# Patient Record
Sex: FEMALE | Race: WHITE | NOT HISPANIC OR LATINO | Employment: OTHER | URBAN - METROPOLITAN AREA
[De-identification: names, ages, dates, MRNs, and addresses within clinical notes are randomized per-mention and may not be internally consistent; named-entity substitution may affect disease eponyms.]

---

## 2023-05-02 DIAGNOSIS — I10 HYPERTENSION, ISOLATED SYSTOLIC: ICD-10-CM

## 2023-05-02 DIAGNOSIS — R35.1 NOCTURIA: ICD-10-CM

## 2023-05-02 DIAGNOSIS — E55.9 VITAMIN D DEFICIENCY: ICD-10-CM

## 2023-05-02 DIAGNOSIS — E78.89 ELEVATED HDL: ICD-10-CM

## 2023-05-02 PROBLEM — F44.9: Status: ACTIVE | Noted: 2023-05-02

## 2023-05-02 PROBLEM — R35.0 FREQUENT URINATION: Status: RESOLVED | Noted: 2023-05-02 | Resolved: 2023-05-02

## 2023-05-02 PROBLEM — S99.929A FOOT INJURY: Status: RESOLVED | Noted: 2023-05-02 | Resolved: 2023-05-02

## 2023-05-02 PROBLEM — N81.10 FEMALE BLADDER PROLAPSE: Status: ACTIVE | Noted: 2023-05-02

## 2023-05-02 PROBLEM — N60.19 FIBROCYSTIC BREAST DISEASE (FCBD): Status: ACTIVE | Noted: 2023-05-02

## 2023-05-02 PROBLEM — R45.1 RESTLESSNESS: Status: ACTIVE | Noted: 2023-05-02

## 2023-05-02 PROBLEM — K21.9 GASTROESOPHAGEAL REFLUX DISEASE: Status: ACTIVE | Noted: 2023-05-02

## 2023-05-02 PROBLEM — N81.4 UTERINE PROLAPSE: Status: RESOLVED | Noted: 2023-05-02 | Resolved: 2023-05-02

## 2023-05-02 PROBLEM — M54.2 CERVICALGIA: Status: ACTIVE | Noted: 2023-05-02

## 2023-05-02 PROBLEM — M17.11 OSTEOARTHRITIS OF RIGHT KNEE: Status: RESOLVED | Noted: 2023-05-02 | Resolved: 2023-05-02

## 2023-05-02 PROBLEM — M79.7 FIBROMYALGIA: Status: ACTIVE | Noted: 2023-05-02

## 2023-05-02 PROBLEM — F43.23 ADJUSTMENT DISORDER WITH MIXED ANXIETY AND DEPRESSED MOOD: Status: ACTIVE | Noted: 2023-05-02

## 2023-05-02 PROBLEM — M81.0 OSTEOPOROSIS: Status: ACTIVE | Noted: 2023-05-02

## 2023-05-02 PROBLEM — F41.9 ANXIETY DISORDER: Status: ACTIVE | Noted: 2023-05-02

## 2023-05-02 PROBLEM — N39.0 UTI (URINARY TRACT INFECTION): Status: RESOLVED | Noted: 2023-05-02 | Resolved: 2023-05-02

## 2023-05-02 PROBLEM — R29.898 WEAKNESS OF LEFT LOWER EXTREMITY: Status: RESOLVED | Noted: 2023-05-02 | Resolved: 2023-05-02

## 2023-05-02 PROBLEM — R26.2 DIFFICULTY WALKING: Status: RESOLVED | Noted: 2023-05-02 | Resolved: 2023-05-02

## 2023-05-02 PROBLEM — R29.898 WEAKNESS OF BOTH HIPS: Status: RESOLVED | Noted: 2023-05-02 | Resolved: 2023-05-02

## 2023-05-02 PROBLEM — M25.561 RIGHT KNEE PAIN: Status: RESOLVED | Noted: 2023-05-02 | Resolved: 2023-05-02

## 2023-05-02 PROBLEM — M17.11 ARTHRITIS OF RIGHT KNEE: Status: RESOLVED | Noted: 2023-05-02 | Resolved: 2023-05-02

## 2023-05-02 PROBLEM — M17.0 OSTEOARTHRITIS OF KNEES, BILATERAL: Status: ACTIVE | Noted: 2023-05-02

## 2023-05-02 PROBLEM — N32.81 OVERACTIVE BLADDER: Status: ACTIVE | Noted: 2023-05-02

## 2023-05-24 ENCOUNTER — LAB (OUTPATIENT)
Dept: LAB | Facility: LAB | Age: 83
End: 2023-05-24
Payer: MEDICARE

## 2023-05-24 DIAGNOSIS — I10 HYPERTENSION, ISOLATED SYSTOLIC: ICD-10-CM

## 2023-05-24 DIAGNOSIS — E78.89 ELEVATED HDL: ICD-10-CM

## 2023-05-24 DIAGNOSIS — R35.1 NOCTURIA: ICD-10-CM

## 2023-05-24 DIAGNOSIS — E55.9 VITAMIN D DEFICIENCY: ICD-10-CM

## 2023-05-24 LAB
ALANINE AMINOTRANSFERASE (SGPT) (U/L) IN SER/PLAS: 17 U/L (ref 7–45)
ALBUMIN (G/DL) IN SER/PLAS: 4 G/DL (ref 3.4–5)
ANION GAP IN SER/PLAS: 13 MMOL/L (ref 10–20)
APPEARANCE, URINE: ABNORMAL
ASPARTATE AMINOTRANSFERASE (SGOT) (U/L) IN SER/PLAS: 20 U/L (ref 9–39)
BACTERIA, URINE: ABNORMAL /HPF
BASOPHILS (10*3/UL) IN BLOOD BY AUTOMATED COUNT: 0.05 X10E9/L (ref 0–0.1)
BASOPHILS/100 LEUKOCYTES IN BLOOD BY AUTOMATED COUNT: 0.8 % (ref 0–2)
BILIRUBIN, URINE: NEGATIVE
BLOOD, URINE: NEGATIVE
CALCIDIOL (25 OH VITAMIN D3) (NG/ML) IN SER/PLAS: 56 NG/ML
CALCIUM (MG/DL) IN SER/PLAS: 8.6 MG/DL (ref 8.6–10.3)
CARBON DIOXIDE, TOTAL (MMOL/L) IN SER/PLAS: 26 MMOL/L (ref 21–32)
CHLORIDE (MMOL/L) IN SER/PLAS: 105 MMOL/L (ref 98–107)
CHOLESTEROL (MG/DL) IN SER/PLAS: 203 MG/DL (ref 0–199)
CHOLESTEROL IN HDL (MG/DL) IN SER/PLAS: 56.3 MG/DL
CHOLESTEROL/HDL RATIO: 3.6
COLOR, URINE: YELLOW
CREATININE (MG/DL) IN SER/PLAS: 0.56 MG/DL (ref 0.5–1.05)
EOSINOPHILS (10*3/UL) IN BLOOD BY AUTOMATED COUNT: 0.18 X10E9/L (ref 0–0.4)
EOSINOPHILS/100 LEUKOCYTES IN BLOOD BY AUTOMATED COUNT: 2.8 % (ref 0–6)
ERYTHROCYTE DISTRIBUTION WIDTH (RATIO) BY AUTOMATED COUNT: 13.2 % (ref 11.5–14.5)
ERYTHROCYTE MEAN CORPUSCULAR HEMOGLOBIN CONCENTRATION (G/DL) BY AUTOMATED: 30.5 G/DL (ref 32–36)
ERYTHROCYTE MEAN CORPUSCULAR VOLUME (FL) BY AUTOMATED COUNT: 102 FL (ref 80–100)
ERYTHROCYTES (10*6/UL) IN BLOOD BY AUTOMATED COUNT: 4.08 X10E12/L (ref 4–5.2)
GFR FEMALE: 90 ML/MIN/1.73M2
GLUCOSE (MG/DL) IN SER/PLAS: 84 MG/DL (ref 74–99)
GLUCOSE, URINE: NEGATIVE MG/DL
HEMATOCRIT (%) IN BLOOD BY AUTOMATED COUNT: 41.6 % (ref 36–46)
HEMOGLOBIN (G/DL) IN BLOOD: 12.7 G/DL (ref 12–16)
IMMATURE GRANULOCYTES/100 LEUKOCYTES IN BLOOD BY AUTOMATED COUNT: 0.2 % (ref 0–0.9)
KETONES, URINE: NEGATIVE MG/DL
LDL: 131 MG/DL (ref 0–99)
LEUKOCYTE ESTERASE, URINE: ABNORMAL
LEUKOCYTES (10*3/UL) IN BLOOD BY AUTOMATED COUNT: 6.4 X10E9/L (ref 4.4–11.3)
LYMPHOCYTES (10*3/UL) IN BLOOD BY AUTOMATED COUNT: 2.04 X10E9/L (ref 0.8–3)
LYMPHOCYTES/100 LEUKOCYTES IN BLOOD BY AUTOMATED COUNT: 31.9 % (ref 13–44)
MONOCYTES (10*3/UL) IN BLOOD BY AUTOMATED COUNT: 0.68 X10E9/L (ref 0.05–0.8)
MONOCYTES/100 LEUKOCYTES IN BLOOD BY AUTOMATED COUNT: 10.6 % (ref 2–10)
MUCUS, URINE: ABNORMAL /LPF
NEUTROPHILS (10*3/UL) IN BLOOD BY AUTOMATED COUNT: 3.44 X10E9/L (ref 1.6–5.5)
NEUTROPHILS/100 LEUKOCYTES IN BLOOD BY AUTOMATED COUNT: 53.7 % (ref 40–80)
NITRITE, URINE: NEGATIVE
PH, URINE: 7 (ref 5–8)
PHOSPHATE (MG/DL) IN SER/PLAS: 3.8 MG/DL (ref 2.5–4.9)
PLATELETS (10*3/UL) IN BLOOD AUTOMATED COUNT: 332 X10E9/L (ref 150–450)
POTASSIUM (MMOL/L) IN SER/PLAS: 3.9 MMOL/L (ref 3.5–5.3)
PROTEIN, URINE: NEGATIVE MG/DL
RBC, URINE: 1 /HPF (ref 0–5)
SODIUM (MMOL/L) IN SER/PLAS: 140 MMOL/L (ref 136–145)
SPECIFIC GRAVITY, URINE: 1.01 (ref 1–1.03)
SQUAMOUS EPITHELIAL CELLS, URINE: 17 /HPF
THYROTROPIN (MIU/L) IN SER/PLAS BY DETECTION LIMIT <= 0.05 MIU/L: 1.76 MIU/L (ref 0.44–3.98)
TRANSITIONAL EPITHELIAL CELLS, URINE: <1 /HPF
TRIGLYCERIDE (MG/DL) IN SER/PLAS: 78 MG/DL (ref 0–149)
UREA NITROGEN (MG/DL) IN SER/PLAS: 19 MG/DL (ref 6–23)
UROBILINOGEN, URINE: <2 MG/DL (ref 0–1.9)
VLDL: 16 MG/DL (ref 0–40)
WBC, URINE: 6 /HPF (ref 0–5)

## 2023-05-24 PROCEDURE — 84450 TRANSFERASE (AST) (SGOT): CPT

## 2023-05-24 PROCEDURE — 80069 RENAL FUNCTION PANEL: CPT

## 2023-05-24 PROCEDURE — 84443 ASSAY THYROID STIM HORMONE: CPT

## 2023-05-24 PROCEDURE — 82306 VITAMIN D 25 HYDROXY: CPT

## 2023-05-24 PROCEDURE — 85025 COMPLETE CBC W/AUTO DIFF WBC: CPT

## 2023-05-24 PROCEDURE — 81001 URINALYSIS AUTO W/SCOPE: CPT

## 2023-05-24 PROCEDURE — 84460 ALANINE AMINO (ALT) (SGPT): CPT

## 2023-05-24 PROCEDURE — 36415 COLL VENOUS BLD VENIPUNCTURE: CPT

## 2023-05-24 PROCEDURE — 80061 LIPID PANEL: CPT

## 2023-05-25 PROBLEM — M17.11 PRIMARY OSTEOARTHRITIS OF RIGHT KNEE: Status: RESOLVED | Noted: 2017-12-11 | Resolved: 2023-05-25

## 2023-05-25 PROBLEM — M25.561 CHRONIC PAIN OF RIGHT KNEE: Status: ACTIVE | Noted: 2017-12-11

## 2023-05-25 PROBLEM — G47.00 INSOMNIA: Status: ACTIVE | Noted: 2023-05-25

## 2023-05-25 PROBLEM — G89.29 CHRONIC PAIN OF RIGHT KNEE: Status: ACTIVE | Noted: 2017-12-11

## 2023-05-25 PROBLEM — I10 ESSENTIAL HYPERTENSION: Status: RESOLVED | Noted: 2017-11-07 | Resolved: 2023-05-25

## 2023-05-25 RX ORDER — FLUTICASONE PROPIONATE 50 MCG
1 SPRAY, SUSPENSION (ML) NASAL
COMMUNITY
Start: 2014-11-26

## 2023-05-25 RX ORDER — DIAZEPAM 5 MG/1
5 TABLET ORAL NIGHTLY PRN
COMMUNITY
Start: 2017-11-07 | End: 2024-02-16 | Stop reason: ALTCHOICE

## 2023-05-25 RX ORDER — CYCLOBENZAPRINE HCL 5 MG
1 TABLET ORAL 3 TIMES DAILY PRN
COMMUNITY
Start: 2022-02-16 | End: 2023-05-30 | Stop reason: ALTCHOICE

## 2023-05-25 RX ORDER — TROSPIUM CHLORIDE 20 MG/1
20 TABLET, FILM COATED ORAL 2 TIMES DAILY
COMMUNITY
Start: 2022-08-15 | End: 2023-05-30 | Stop reason: ALTCHOICE

## 2023-05-25 RX ORDER — CHOLECALCIFEROL (VITAMIN D3) 25 MCG
25 TABLET ORAL EVERY 24 HOURS
COMMUNITY
Start: 2015-10-23

## 2023-05-25 RX ORDER — METHYLPREDNISOLONE 4 MG
500 TABLET, DOSE PACK ORAL 3 TIMES DAILY
COMMUNITY
Start: 2017-05-25 | End: 2024-02-16 | Stop reason: ALTCHOICE

## 2023-05-25 RX ORDER — HYDROCHLOROTHIAZIDE 25 MG/1
25 TABLET ORAL
COMMUNITY
Start: 2018-07-06 | End: 2023-05-30 | Stop reason: ALTCHOICE

## 2023-05-25 RX ORDER — DOCUSATE SODIUM 100 MG/1
100 CAPSULE, LIQUID FILLED ORAL 2 TIMES DAILY
COMMUNITY
Start: 2022-09-26

## 2023-05-25 RX ORDER — CELECOXIB 100 MG/1
100 CAPSULE ORAL 2 TIMES DAILY
COMMUNITY
Start: 2022-09-26 | End: 2023-05-30 | Stop reason: ALTCHOICE

## 2023-05-25 RX ORDER — TRAMADOL HYDROCHLORIDE 50 MG/1
50 TABLET ORAL EVERY 6 HOURS PRN
COMMUNITY
Start: 2014-03-22 | End: 2024-02-16 | Stop reason: ALTCHOICE

## 2023-05-25 RX ORDER — AMLODIPINE BESYLATE 5 MG/1
5 TABLET ORAL DAILY
COMMUNITY
End: 2023-10-11 | Stop reason: SDUPTHER

## 2023-05-25 RX ORDER — DENOSUMAB 60 MG/ML
60 INJECTION SUBCUTANEOUS
COMMUNITY
Start: 2021-08-04 | End: 2023-07-12 | Stop reason: SDUPTHER

## 2023-05-25 RX ORDER — ASPIRIN 81 MG/1
81 TABLET ORAL DAILY
COMMUNITY
Start: 2018-11-07 | End: 2023-05-30 | Stop reason: ALTCHOICE

## 2023-05-25 RX ORDER — CHLORHEXIDINE GLUCONATE ORAL RINSE 1.2 MG/ML
15 SOLUTION DENTAL AS NEEDED
COMMUNITY
Start: 2022-09-08 | End: 2023-05-30 | Stop reason: ALTCHOICE

## 2023-05-25 RX ORDER — METOPROLOL SUCCINATE 50 MG/1
50 TABLET, EXTENDED RELEASE ORAL DAILY
COMMUNITY
End: 2023-06-08 | Stop reason: SDUPTHER

## 2023-05-30 ENCOUNTER — OFFICE VISIT (OUTPATIENT)
Dept: PRIMARY CARE | Facility: CLINIC | Age: 83
End: 2023-05-30
Payer: MEDICARE

## 2023-05-30 VITALS
RESPIRATION RATE: 16 BRPM | SYSTOLIC BLOOD PRESSURE: 140 MMHG | DIASTOLIC BLOOD PRESSURE: 76 MMHG | HEIGHT: 67 IN | HEART RATE: 72 BPM | WEIGHT: 171 LBS | BODY MASS INDEX: 26.84 KG/M2

## 2023-05-30 DIAGNOSIS — N81.10 FEMALE BLADDER PROLAPSE: ICD-10-CM

## 2023-05-30 DIAGNOSIS — F51.01 PRIMARY INSOMNIA: ICD-10-CM

## 2023-05-30 DIAGNOSIS — Z51.81 THERAPEUTIC DRUG MONITORING: ICD-10-CM

## 2023-05-30 DIAGNOSIS — M81.0 AGE-RELATED OSTEOPOROSIS WITHOUT CURRENT PATHOLOGICAL FRACTURE: ICD-10-CM

## 2023-05-30 DIAGNOSIS — N60.19 FIBROCYSTIC BREAST DISEASE (FCBD), UNSPECIFIED LATERALITY: ICD-10-CM

## 2023-05-30 DIAGNOSIS — F41.9 ANXIETY: ICD-10-CM

## 2023-05-30 DIAGNOSIS — F43.23 ADJUSTMENT DISORDER WITH MIXED ANXIETY AND DEPRESSED MOOD: ICD-10-CM

## 2023-05-30 DIAGNOSIS — Z12.31 ENCOUNTER FOR SCREENING MAMMOGRAM FOR MALIGNANT NEOPLASM OF BREAST: ICD-10-CM

## 2023-05-30 DIAGNOSIS — E78.89 ELEVATED HDL: ICD-10-CM

## 2023-05-30 DIAGNOSIS — M17.0 PRIMARY OSTEOARTHRITIS OF BOTH KNEES: Primary | ICD-10-CM

## 2023-05-30 DIAGNOSIS — I10 HYPERTENSION, ISOLATED SYSTOLIC: ICD-10-CM

## 2023-05-30 DIAGNOSIS — Z00.00 ROUTINE GENERAL MEDICAL EXAMINATION AT HEALTH CARE FACILITY: ICD-10-CM

## 2023-05-30 DIAGNOSIS — Z00.00 ROUTINE GENERAL MEDICAL EXAMINATION AT A HEALTH CARE FACILITY: Primary | ICD-10-CM

## 2023-05-30 DIAGNOSIS — F41.9 ANXIETY DISORDER, UNSPECIFIED TYPE: ICD-10-CM

## 2023-05-30 DIAGNOSIS — R60.0 LOCALIZED EDEMA: ICD-10-CM

## 2023-05-30 PROCEDURE — G0439 PPPS, SUBSEQ VISIT: HCPCS | Performed by: INTERNAL MEDICINE

## 2023-05-30 PROCEDURE — 93000 ELECTROCARDIOGRAM COMPLETE: CPT | Performed by: INTERNAL MEDICINE

## 2023-05-30 PROCEDURE — 99214 OFFICE O/P EST MOD 30 MIN: CPT | Performed by: INTERNAL MEDICINE

## 2023-05-30 PROCEDURE — 3078F DIAST BP <80 MM HG: CPT | Performed by: INTERNAL MEDICINE

## 2023-05-30 PROCEDURE — G0009 ADMIN PNEUMOCOCCAL VACCINE: HCPCS | Performed by: INTERNAL MEDICINE

## 2023-05-30 PROCEDURE — 1036F TOBACCO NON-USER: CPT | Performed by: INTERNAL MEDICINE

## 2023-05-30 PROCEDURE — 3077F SYST BP >= 140 MM HG: CPT | Performed by: INTERNAL MEDICINE

## 2023-05-30 PROCEDURE — 1159F MED LIST DOCD IN RCRD: CPT | Performed by: INTERNAL MEDICINE

## 2023-05-30 PROCEDURE — 1126F AMNT PAIN NOTED NONE PRSNT: CPT | Performed by: INTERNAL MEDICINE

## 2023-05-30 PROCEDURE — 90677 PCV20 VACCINE IM: CPT | Performed by: INTERNAL MEDICINE

## 2023-05-30 PROCEDURE — 1160F RVW MEDS BY RX/DR IN RCRD: CPT | Performed by: INTERNAL MEDICINE

## 2023-05-30 ASSESSMENT — ENCOUNTER SYMPTOMS: OCCASIONAL FEELINGS OF UNSTEADINESS: 0

## 2023-05-30 NOTE — PROGRESS NOTES
Edema left greater than right  biLateral knee scars  Subjective   Reason for Visit: Alecia Baptiste is an 82 y.o. female here for a Medicare Wellness visit.     Past Medical, Surgical, and Family History reviewed and updated in chart.       Overall doing well status post right TKA  Improve mobility, less pain, rare use of tramadol, using cane out of home, usual analgesic Motrin 1-2 tabs  Physical therapy requested for core strengthening and hips    Stress  decreased mobility and back pain  [place] helpful  For cataract  Skin check in [place]  Weight, leg swelling, question diuretic  Nocturia x 2 questional bladder lift      Patient Care Team:  Kye De La O MD as PCP - General  Kye De La O MD as PCP - [facility]     Review of Systems   Constitutional:  Negative for chills, fatigue, fever and unexpected weight change.        Weight 170 pounds   HENT:  Positive for hearing loss. Negative for dental problem, sore throat, tinnitus, trouble swallowing and voice change.    Eyes: Negative.  Negative for visual disturbance.        Eye care up-to-date   Respiratory: Negative.  Negative for cough, chest tightness and shortness of breath.    Cardiovascular:  Positive for leg swelling.   Gastrointestinal: Negative.    Endocrine: Negative.  Negative for polydipsia, polyphagia and polyuria.   Genitourinary:  Positive for frequency and urgency.        Nocturia   Musculoskeletal:  Positive for arthralgias. Negative for gait problem.        As noted in HPI   Skin: Negative.         Derm evaluation in [place]   Neurological: Negative.  Negative for tremors, weakness, light-headedness, numbness and headaches.   Psychiatric/Behavioral:  Positive for dysphoric mood. Negative for sleep disturbance. The patient is not nervous/anxious.         Continues counseling concerning son sudden death,  health, 63 years of marriage       Objective   Vitals:  /76 (BP Location: Left arm, Patient Position:  "Sitting)   Pulse 72   Resp 16   Ht 1.702 m (5' 7\")   Wt 77.6 kg (171 lb)   BMI 26.78 kg/m²       Physical Exam  Constitutional:       General: She is not in acute distress.     Appearance: Normal appearance. She is normal weight.   HENT:      Head: Normocephalic.      Right Ear: Tympanic membrane normal.      Left Ear: Tympanic membrane normal.      Ears:      Comments: Hearing intact to speech and finger rub     Nose: Nose normal.      Mouth/Throat:      Mouth: Mucous membranes are moist.      Pharynx: Oropharynx is clear.   Eyes:      General: No scleral icterus.     Extraocular Movements: Extraocular movements intact.      Conjunctiva/sclera: Conjunctivae normal.   Neck:      Vascular: No carotid bruit.      Comments: No JVD or thyromegaly, spine nontender  Cardiovascular:      Rate and Rhythm: Normal rate and regular rhythm.      Pulses: Normal pulses.      Heart sounds: Normal heart sounds. No murmur heard.  Pulmonary:      Effort: Pulmonary effort is normal.      Breath sounds: Normal breath sounds.   Abdominal:      General: Abdomen is flat. Bowel sounds are normal. There is no distension.      Palpations: Abdomen is soft. There is no mass.      Tenderness: There is no abdominal tenderness.      Hernia: No hernia is present.   Musculoskeletal:         General: Normal range of motion.      Cervical back: Normal range of motion and neck supple. No tenderness.      Right lower leg: Edema present.      Left lower leg: Edema present.      Comments: Bilateral knee surgical scars   Lymphadenopathy:      Cervical: No cervical adenopathy.   Skin:     General: Skin is warm.      Findings: No lesion or rash.   Neurological:      General: No focal deficit present.      Mental Status: She is alert and oriented to person, place, and time.      Cranial Nerves: No cranial nerve deficit.      Sensory: No sensory deficit.      Motor: No weakness.      Coordination: Coordination normal.      Gait: Gait normal.      Deep " Tendon Reflexes: Reflexes normal.   Psychiatric:         Mood and Affect: Mood normal.         Behavior: Behavior normal.         Thought Content: Thought content normal.     Data  Electrocardiogram normal sinus rhythm  Lab 5/24/2023 reviewed satisfactory  Bone densitometry 6/16/2021 osteoporosis  Colonoscopy 2006, January 2011, no Cologuard  Mammogram 10/30/2018  CSA 1/13/2021  CT scan head July 2018      Assessment/Plan     Overall you are in good health today age and gender appropriate wellness services reviewed  No active heart disease and risk factors at satisfactory/target levels  Coronary artery calcium CT score in 2011 was low risk  Cardiac risk counseling  Age and gender appropriate cancer screening prevention is up-to-date with a follow-up mammogram  Osteoporosis is present, recheck bone densitometry, reinforced adequate calcium and vitamin D intake, weightbearing exercise as tolerated  Immunizations updated with Prevnar 20  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 good response to bilateral knee replacement  Cervical spondylosis stable to improved  Adjustment disorder with mixed emotional features coping well with current medication and counseling  Controlled substance patient agreement  Cystocele/pelvic relaxation syndrome reviewed  Peripheral edema likely secondary to venous stasis, recommend conservative care  Problem List Items Addressed This Visit       Female bladder prolapse    Osteoporosis    Relevant Orders    XR DEXA bone density    Hypertension, isolated systolic    Relevant Orders    ECG 12 lead (Clinic Performed) (Completed)    BI mammo bilateral screening tomosynthesis    Fibrocystic breast disease (FCBD)    Relevant Orders    BI mammo bilateral screening tomosynthesis    Elevated HDL    Anxiety    Relevant Orders    Drug Screen, Urine With Reflex to Confirmation (Completed)    Adjustment disorder with mixed anxiety and depressed mood    Insomnia    Therapeutic drug monitoring    Relevant Orders     Drug Screen, Urine With Reflex to Confirmation (Completed)    Routine general medical examination at a health care facility - Primary     Other Visit Diagnoses       Encounter for screening mammogram for malignant neoplasm of breast        Relevant Orders    BI mammo bilateral screening tomosynthesis

## 2023-06-07 ENCOUNTER — LAB (OUTPATIENT)
Dept: LAB | Facility: LAB | Age: 83
End: 2023-06-07
Payer: MEDICARE

## 2023-06-07 DIAGNOSIS — Z51.81 THERAPEUTIC DRUG MONITORING: ICD-10-CM

## 2023-06-07 DIAGNOSIS — F41.9 ANXIETY: ICD-10-CM

## 2023-06-07 LAB
AMPHETAMINE (PRESENCE) IN URINE BY SCREEN METHOD: NORMAL
BARBITURATES PRESENCE IN URINE BY SCREEN METHOD: NORMAL
BENZODIAZEPINE (PRESENCE) IN URINE BY SCREEN METHOD: NORMAL
CANNABINOIDS IN URINE BY SCREEN METHOD: NORMAL
COCAINE (PRESENCE) IN URINE BY SCREEN METHOD: NORMAL
DRUG SCREEN COMMENT URINE: NORMAL
FENTANYL URINE: NORMAL
METHADONE (PRESENCE) IN URINE BY SCREEN METHOD: NORMAL
OPIATES (PRESENCE) IN URINE BY SCREEN METHOD: NORMAL
OXYCODONE (PRESENCE) IN URINE BY SCREEN METHOD: NORMAL
PHENCYCLIDINE (PRESENCE) IN URINE BY SCREEN METHOD: NORMAL

## 2023-06-07 PROCEDURE — 80307 DRUG TEST PRSMV CHEM ANLYZR: CPT

## 2023-06-08 DIAGNOSIS — I10 BENIGN ESSENTIAL HTN: ICD-10-CM

## 2023-06-09 RX ORDER — METOPROLOL SUCCINATE 50 MG/1
50 TABLET, EXTENDED RELEASE ORAL DAILY
Qty: 90 TABLET | Refills: 3 | Status: SHIPPED | OUTPATIENT
Start: 2023-06-09

## 2023-07-12 DIAGNOSIS — M81.0 AGE-RELATED OSTEOPOROSIS WITHOUT CURRENT PATHOLOGICAL FRACTURE: Primary | ICD-10-CM

## 2023-07-14 RX ORDER — DENOSUMAB 60 MG/ML
60 INJECTION SUBCUTANEOUS
Qty: 1 ML | Refills: 1 | Status: SHIPPED | OUTPATIENT
Start: 2023-07-14 | End: 2023-08-17 | Stop reason: SDUPTHER

## 2023-08-10 PROBLEM — Z00.00 ROUTINE GENERAL MEDICAL EXAMINATION AT A HEALTH CARE FACILITY: Status: ACTIVE | Noted: 2023-08-10

## 2023-08-10 PROBLEM — Z51.81 THERAPEUTIC DRUG MONITORING: Status: ACTIVE | Noted: 2023-08-10

## 2023-08-10 PROBLEM — R60.0 LOCALIZED EDEMA: Status: ACTIVE | Noted: 2023-08-10

## 2023-08-10 ASSESSMENT — ENCOUNTER SYMPTOMS
WEAKNESS: 0
FEVER: 0
LIGHT-HEADEDNESS: 0
CHEST TIGHTNESS: 0
RESPIRATORY NEGATIVE: 1
TROUBLE SWALLOWING: 0
POLYDIPSIA: 0
DYSPHORIC MOOD: 1
SORE THROAT: 0
NUMBNESS: 0
VOICE CHANGE: 0
FATIGUE: 0
GASTROINTESTINAL NEGATIVE: 1
HEADACHES: 0
NEUROLOGICAL NEGATIVE: 1
ARTHRALGIAS: 1
COUGH: 0
POLYPHAGIA: 0
SLEEP DISTURBANCE: 0
CHILLS: 0
NERVOUS/ANXIOUS: 0
TREMORS: 0
UNEXPECTED WEIGHT CHANGE: 0
FREQUENCY: 1
EYES NEGATIVE: 1
SHORTNESS OF BREATH: 0
ENDOCRINE NEGATIVE: 1

## 2023-08-17 DIAGNOSIS — M81.0 AGE-RELATED OSTEOPOROSIS WITHOUT CURRENT PATHOLOGICAL FRACTURE: ICD-10-CM

## 2023-08-17 RX ORDER — DENOSUMAB 60 MG/ML
60 INJECTION SUBCUTANEOUS
Qty: 1 ML | Refills: 1 | Status: SHIPPED | OUTPATIENT
Start: 2023-08-17 | End: 2024-02-16 | Stop reason: ALTCHOICE

## 2023-10-11 DIAGNOSIS — I10 HYPERTENSION, ISOLATED SYSTOLIC: Primary | ICD-10-CM

## 2023-10-11 RX ORDER — AMLODIPINE BESYLATE 5 MG/1
5 TABLET ORAL DAILY
Qty: 90 TABLET | Refills: 3 | Status: SHIPPED | OUTPATIENT
Start: 2023-10-11

## 2024-02-16 ENCOUNTER — OFFICE VISIT (OUTPATIENT)
Dept: PRIMARY CARE | Facility: CLINIC | Age: 84
End: 2024-02-16
Payer: MEDICARE

## 2024-02-16 VITALS
RESPIRATION RATE: 18 BRPM | BODY MASS INDEX: 27.62 KG/M2 | HEIGHT: 67 IN | OXYGEN SATURATION: 98 % | WEIGHT: 176 LBS | HEART RATE: 76 BPM | DIASTOLIC BLOOD PRESSURE: 83 MMHG | SYSTOLIC BLOOD PRESSURE: 173 MMHG

## 2024-02-16 DIAGNOSIS — Z00.00 HEALTHCARE MAINTENANCE: ICD-10-CM

## 2024-02-16 DIAGNOSIS — M81.0 AGE RELATED OSTEOPOROSIS, UNSPECIFIED PATHOLOGICAL FRACTURE PRESENCE: ICD-10-CM

## 2024-02-16 DIAGNOSIS — F41.9 ANXIETY: ICD-10-CM

## 2024-02-16 DIAGNOSIS — E55.9 VITAMIN D DEFICIENCY: ICD-10-CM

## 2024-02-16 DIAGNOSIS — I10 ESSENTIAL (PRIMARY) HYPERTENSION: ICD-10-CM

## 2024-02-16 PROBLEM — H40.009 GLAUCOMA SUSPECT: Status: ACTIVE | Noted: 2023-05-30

## 2024-02-16 PROBLEM — Z96.1 PSEUDOPHAKIA OF RIGHT EYE: Status: ACTIVE | Noted: 2023-08-08

## 2024-02-16 PROCEDURE — 1123F ACP DISCUSS/DSCN MKR DOCD: CPT | Performed by: INTERNAL MEDICINE

## 2024-02-16 PROCEDURE — 1158F ADVNC CARE PLAN TLK DOCD: CPT | Performed by: INTERNAL MEDICINE

## 2024-02-16 PROCEDURE — 99204 OFFICE O/P NEW MOD 45 MIN: CPT | Performed by: INTERNAL MEDICINE

## 2024-02-16 PROCEDURE — 3077F SYST BP >= 140 MM HG: CPT | Performed by: INTERNAL MEDICINE

## 2024-02-16 PROCEDURE — 3079F DIAST BP 80-89 MM HG: CPT | Performed by: INTERNAL MEDICINE

## 2024-02-16 PROCEDURE — 1159F MED LIST DOCD IN RCRD: CPT | Performed by: INTERNAL MEDICINE

## 2024-02-16 PROCEDURE — 1036F TOBACCO NON-USER: CPT | Performed by: INTERNAL MEDICINE

## 2024-02-16 PROCEDURE — 1160F RVW MEDS BY RX/DR IN RCRD: CPT | Performed by: INTERNAL MEDICINE

## 2024-02-16 PROCEDURE — 1126F AMNT PAIN NOTED NONE PRSNT: CPT | Performed by: INTERNAL MEDICINE

## 2024-02-16 RX ORDER — BUSPIRONE HYDROCHLORIDE 10 MG/1
10 TABLET ORAL 3 TIMES DAILY PRN
Qty: 90 TABLET | Refills: 0 | Status: SHIPPED | OUTPATIENT
Start: 2024-02-16 | End: 2024-03-18 | Stop reason: SDUPTHER

## 2024-02-16 ASSESSMENT — PATIENT HEALTH QUESTIONNAIRE - PHQ9
1. LITTLE INTEREST OR PLEASURE IN DOING THINGS: NOT AT ALL
SUM OF ALL RESPONSES TO PHQ9 QUESTIONS 1 AND 2: 3
5. POOR APPETITE OR OVEREATING: NOT AT ALL
4. FEELING TIRED OR HAVING LITTLE ENERGY: NOT AT ALL
7. TROUBLE CONCENTRATING ON THINGS, SUCH AS READING THE NEWSPAPER OR WATCHING TELEVISION: NOT AT ALL
SUM OF ALL RESPONSES TO PHQ QUESTIONS 1-9: 4
10. IF YOU CHECKED OFF ANY PROBLEMS, HOW DIFFICULT HAVE THESE PROBLEMS MADE IT FOR YOU TO DO YOUR WORK, TAKE CARE OF THINGS AT HOME, OR GET ALONG WITH OTHER PEOPLE: NOT DIFFICULT AT ALL
3. TROUBLE FALLING OR STAYING ASLEEP OR SLEEPING TOO MUCH: SEVERAL DAYS
6. FEELING BAD ABOUT YOURSELF - OR THAT YOU ARE A FAILURE OR HAVE LET YOURSELF OR YOUR FAMILY DOWN: NOT AT ALL
2. FEELING DOWN, DEPRESSED OR HOPELESS: NEARLY EVERY DAY
9. THOUGHTS THAT YOU WOULD BE BETTER OFF DEAD, OR OF HURTING YOURSELF: NOT AT ALL
8. MOVING OR SPEAKING SO SLOWLY THAT OTHER PEOPLE COULD HAVE NOTICED. OR THE OPPOSITE, BEING SO FIGETY OR RESTLESS THAT YOU HAVE BEEN MOVING AROUND A LOT MORE THAN USUAL: NOT AT ALL

## 2024-02-16 ASSESSMENT — PAIN SCALES - GENERAL: PAINLEVEL: 0-NO PAIN

## 2024-02-16 ASSESSMENT — ENCOUNTER SYMPTOMS: FREQUENCY: 1

## 2024-02-16 NOTE — PATIENT INSTRUCTIONS
It was nice to meet you in the office today  As discussed during our visit...    You are due for a DEXA scan (Bone Density screening). Someone will contact you soon to schedule this.  - we will call you with the results and possibly starting on fosamax weekly at home.    STOP Valium  START on Buspar 10mg three times a day as needed for anxiety    Monitor blood pressure three times a week for a few weeks and let us know how it runs.

## 2024-02-16 NOTE — PROGRESS NOTES
Patient ID:   Alecia Baptiste is a 83 y.o. female with PMH remarkable for osteoporosis, HTN, anxiety,  who presents to the office today for New Patient Visit, Anxiety, and Depression ( is in Nursing Home in [place], not handling it well. ).    HEALTH MAINTENANCE: Establish Care  Previous PCP: Dr Kye De La O  Smoking: Never a Smoker  Mammogram (40-75): 2018 -ve -> out of age range  - mother had breast cancer in her 60's  Labs: 5/24/2023  Colonoscopy (45-75): 1/15/2020 cologuard  DEXA (65+, q 2 years):  2021 showing osteoporosis -> DUE  - 1 son passed away unexpectedly in his 40s  - reports that she has a huge desire to live  - has a a lot of grandchildren that she is adores  - sleeping and eating ok  -  is in a SNF currently    Pt saw Dr Haven Mayer MD for OBGYN  - let us know if you would like to see a urologist, GYN for the OAB    Discussed with her about starting on a SSRI OR PRN buspar. She would like to try the PRN buspar and stop the Valium. She does not take the valium often.   - discussed with her about STOPPING Valium PRN  - ok to STOP prolia injection. Will order dexa scan to reassess. I feel she would benefit from taking a weekly fosamax. Pt does not believe that she has been on this in the past.     SOCIAL HISTORY:  Social History     Tobacco Use    Smoking status: Never     Passive exposure: Never    Smokeless tobacco: Never   Vaping Use    Vaping Use: Never used   Substance Use Topics    Alcohol use: Yes     Alcohol/week: 7.0 standard drinks of alcohol     Types: 7 Glasses of wine per week    Drug use: Never     IMMUNIZATIONS:  Immunization History   Administered Date(s) Administered    Flu vaccine, quadrivalent, high-dose, preservative free, age 65y+ (FLUZONE) 12/01/2023    Influenza, High Dose Seasonal, Preservative Free 11/07/2017, 09/19/2020    Influenza, Seasonal, Quadrivalent, Adjuvanted 12/06/2021, 11/14/2022    Influenza, injectable, quadrivalent 09/25/2014, 11/01/2016     Influenza, seasonal, injectable 10/23/2015    Influenza, trivalent, adjuvanted 11/14/2018, 12/07/2019    Moderna SARS-CoV-2 Vaccination 01/19/2021, 02/16/2021, 11/10/2021    Pfizer COVID-19 vaccine, bivalent, age 12 years and older (30 mcg/0.3 mL) 12/04/2022    Pfizer Gray Cap SARS-CoV-2 06/04/2022    Pneumococcal conjugate vaccine, 13-valent (PREVNAR 13) 10/23/2015    Pneumococcal conjugate vaccine, 20-valent (PREVNAR 20) 05/30/2023    Pneumococcal polysaccharide vaccine, 23-valent, age 2 years and older (PNEUMOVAX 23) 11/17/2003    Td vaccine, age 7 years and older (TDVAX) 03/14/1992, 01/10/2000, 10/26/2009    Zoster vaccine, recombinant, adult (SHINGRIX) 09/19/2020, 03/03/2021    Zoster, live 01/09/2009     REVIEW OF SYSTEMS:  Review of Systems   Genitourinary:  Positive for frequency and urgency.   Psychiatric/Behavioral:          Increased stress lately   All other systems reviewed and are negative.    ALLERGIES:  No Known Allergies     VITAL SIGNS:  Vitals:    02/16/24 1355   BP: 173/83   Pulse:    Resp:    SpO2:      Physical Exam  Vitals reviewed.   Constitutional:       General: She is not in acute distress.     Appearance: Normal appearance. She is not ill-appearing.   HENT:      Head: Normocephalic and atraumatic.      Right Ear: Tympanic membrane and external ear normal.      Left Ear: Tympanic membrane and external ear normal.      Nose: Nose normal.      Mouth/Throat:      Mouth: Mucous membranes are moist.      Pharynx: Oropharynx is clear.   Eyes:      Conjunctiva/sclera: Conjunctivae normal.      Pupils: Pupils are equal, round, and reactive to light.   Cardiovascular:      Rate and Rhythm: Normal rate and regular rhythm.      Heart sounds: Normal heart sounds. No murmur heard.  Pulmonary:      Effort: Pulmonary effort is normal. No respiratory distress.      Breath sounds: Normal breath sounds. No wheezing.   Abdominal:      General: There is no distension.      Palpations: Abdomen is soft. There  is no mass.      Tenderness: There is no abdominal tenderness.   Musculoskeletal:         General: Normal range of motion.      Cervical back: Normal range of motion and neck supple.   Skin:     General: Skin is warm and dry.   Neurological:      General: No focal deficit present.      Mental Status: She is alert and oriented to person, place, and time.      Sensory: No sensory deficit.      Motor: No weakness.      Coordination: Coordination normal.      Gait: Gait normal.   Psychiatric:         Mood and Affect: Mood normal.         Behavior: Behavior normal.       MEDICATIONS:  Current Outpatient Medications on File Prior to Visit   Medication Sig Dispense Refill    amLODIPine (Norvasc) 5 mg tablet Take 1 tablet (5 mg) by mouth once daily. 90 tablet 3    cholecalciferol (Vitamin D-3) 25 MCG (1000 UT) tablet Take 1 tablet (25 mcg) by mouth once every 24 hours.      docusate sodium (Colace) 100 mg capsule Take 1 capsule (100 mg) by mouth 2 times a day.      fluticasone (Flonase) 50 mcg/actuation nasal spray 1 spray by Does not apply route.      metoprolol succinate XL (Toprol-XL) 50 mg 24 hr tablet Take 1 tablet (50 mg) by mouth once daily. 90 tablet 3    [DISCONTINUED] denosumab (Prolia) 60 mg/mL syringe Inject 1 mL (60 mg) under the skin every 6 months. 1 mL 1    [DISCONTINUED] diazePAM (Valium) 5 mg tablet Take 1 tablet (5 mg) by mouth as needed at bedtime for sleep.      [DISCONTINUED] glucosamine sulfate 500 mg tablet Take 500 mg by mouth 3 times a day.      [DISCONTINUED] traMADol (Ultram) 50 mg tablet Take 1 tablet (50 mg) by mouth every 6 hours if needed.       No current facility-administered medications on file prior to visit.      LABORATORY DATA:  Lab Results   Component Value Date    WBC 6.4 05/24/2023    HGB 12.7 05/24/2023    HCT 41.6 05/24/2023     05/24/2023    CHOL 203 (H) 05/24/2023    TRIG 78 05/24/2023    HDL 56.3 05/24/2023    ALT 17 05/24/2023    AST 20 05/24/2023     05/24/2023     K 3.9 05/24/2023     05/24/2023    CREATININE 0.56 05/24/2023    BUN 19 05/24/2023    CO2 26 05/24/2023    TSH 1.76 05/24/2023    INR 1.0 07/10/2018       ASSESSMENT AND PLAN:  Assessment/Plan   Diagnoses and all orders for this visit:  Healthcare maintenance  Age related osteoporosis, unspecified pathological fracture presence  Comments:  - ok to stop prolia injec  - get Dexa scan  - pending those results, may start weekly fosamax  Orders:  -     XR DEXA bone density; Future  Anxiety  -     busPIRone (Buspar) 10 mg tablet; Take 1 tablet (10 mg) by mouth 3 times a day as needed (anxiety).  Essential (primary) hypertension  Comments:  - c/w metoprolol and amlodipine  Vitamin D deficiency  Comments:  - c/w supplementation  Other orders  -     Full code      --------------------  Written by Mya Corey RN, acting as a scribe for Dr. Maier. This note accurately reflects the work and decisions made by Dr. Maier.     I, Dr. Maier, attest all medical record entries made by the scribe were under my direction and were personally dictated by me. I have reviewed the chart and agree that the record accurately reflects my performance of the history, physical exam, and assessment and plan.

## 2024-02-19 PROBLEM — G47.00 INSOMNIA: Status: RESOLVED | Noted: 2023-05-25 | Resolved: 2024-02-19

## 2024-02-19 PROBLEM — Z00.00 ROUTINE GENERAL MEDICAL EXAMINATION AT A HEALTH CARE FACILITY: Status: RESOLVED | Noted: 2023-08-10 | Resolved: 2024-02-19

## 2024-02-19 PROBLEM — R35.1 NOCTURIA: Status: RESOLVED | Noted: 2023-05-02 | Resolved: 2024-02-19

## 2024-02-19 PROBLEM — I10 HYPERTENSION, ISOLATED SYSTOLIC: Status: RESOLVED | Noted: 2023-05-02 | Resolved: 2024-02-19

## 2024-02-19 PROBLEM — R60.0 LOCALIZED EDEMA: Status: RESOLVED | Noted: 2023-08-10 | Resolved: 2024-02-19

## 2024-02-19 PROBLEM — M25.561 CHRONIC PAIN OF RIGHT KNEE: Status: RESOLVED | Noted: 2017-12-11 | Resolved: 2024-02-19

## 2024-02-19 PROBLEM — R45.1 RESTLESSNESS: Status: RESOLVED | Noted: 2023-05-02 | Resolved: 2024-02-19

## 2024-02-19 PROBLEM — E78.89 ELEVATED HDL: Status: RESOLVED | Noted: 2023-05-02 | Resolved: 2024-02-19

## 2024-02-19 PROBLEM — Z51.81 THERAPEUTIC DRUG MONITORING: Status: RESOLVED | Noted: 2023-08-10 | Resolved: 2024-02-19

## 2024-02-19 PROBLEM — G89.29 CHRONIC PAIN OF RIGHT KNEE: Status: RESOLVED | Noted: 2017-12-11 | Resolved: 2024-02-19

## 2024-03-08 ENCOUNTER — APPOINTMENT (OUTPATIENT)
Dept: RADIOLOGY | Facility: HOSPITAL | Age: 84
End: 2024-03-08
Payer: MEDICARE

## 2024-03-12 ENCOUNTER — APPOINTMENT (OUTPATIENT)
Dept: RADIOLOGY | Facility: HOSPITAL | Age: 84
End: 2024-03-12
Payer: MEDICARE

## 2024-03-18 DIAGNOSIS — F41.9 ANXIETY: ICD-10-CM

## 2024-03-18 RX ORDER — BUSPIRONE HYDROCHLORIDE 10 MG/1
10 TABLET ORAL 3 TIMES DAILY PRN
Qty: 90 TABLET | Refills: 0 | Status: SHIPPED | OUTPATIENT
Start: 2024-03-18

## 2024-03-28 ENCOUNTER — TELEPHONE (OUTPATIENT)
Dept: PRIMARY CARE | Facility: CLINIC | Age: 84
End: 2024-03-28
Payer: MEDICARE

## 2024-03-28 DIAGNOSIS — F51.01 PRIMARY INSOMNIA: Primary | ICD-10-CM

## 2024-03-28 RX ORDER — TRAZODONE HYDROCHLORIDE 50 MG/1
50 TABLET ORAL NIGHTLY PRN
Qty: 30 TABLET | Refills: 0 | Status: SHIPPED | OUTPATIENT
Start: 2024-03-28 | End: 2024-04-25 | Stop reason: SDUPTHER

## 2024-04-25 DIAGNOSIS — F51.01 PRIMARY INSOMNIA: ICD-10-CM

## 2024-04-26 RX ORDER — TRAZODONE HYDROCHLORIDE 50 MG/1
50 TABLET ORAL NIGHTLY PRN
Qty: 30 TABLET | Refills: 0 | Status: SHIPPED | OUTPATIENT
Start: 2024-04-26 | End: 2025-04-26

## 2024-06-11 DIAGNOSIS — I10 BENIGN ESSENTIAL HTN: ICD-10-CM

## 2024-06-11 RX ORDER — METOPROLOL SUCCINATE 50 MG/1
50 TABLET, EXTENDED RELEASE ORAL DAILY
Qty: 90 TABLET | Refills: 0 | Status: SHIPPED | OUTPATIENT
Start: 2024-06-11

## 2024-06-14 DIAGNOSIS — F51.01 PRIMARY INSOMNIA: ICD-10-CM

## 2024-06-14 RX ORDER — TRAZODONE HYDROCHLORIDE 50 MG/1
50 TABLET ORAL NIGHTLY PRN
Qty: 30 TABLET | Refills: 2 | Status: SHIPPED | OUTPATIENT
Start: 2024-06-14 | End: 2025-06-14

## 2024-06-27 ENCOUNTER — HOSPITAL ENCOUNTER (OUTPATIENT)
Dept: RADIOLOGY | Facility: HOSPITAL | Age: 84
Discharge: HOME | End: 2024-06-27
Payer: MEDICARE

## 2024-06-27 DIAGNOSIS — M81.0 AGE RELATED OSTEOPOROSIS, UNSPECIFIED PATHOLOGICAL FRACTURE PRESENCE: ICD-10-CM

## 2024-06-27 PROCEDURE — 77080 DXA BONE DENSITY AXIAL: CPT | Performed by: RADIOLOGY

## 2024-06-27 PROCEDURE — 77080 DXA BONE DENSITY AXIAL: CPT

## 2024-06-27 ASSESSMENT — LIFESTYLE VARIABLES
CURRENT_SMOKER: N
3_OR_MORE_DRINKS_PER_DAY: N

## 2024-07-02 ENCOUNTER — TELEPHONE (OUTPATIENT)
Dept: PRIMARY CARE | Facility: CLINIC | Age: 84
End: 2024-07-02
Payer: MEDICARE

## 2024-07-10 ENCOUNTER — APPOINTMENT (OUTPATIENT)
Dept: PRIMARY CARE | Facility: CLINIC | Age: 84
End: 2024-07-10
Payer: MEDICARE

## 2024-07-10 VITALS
RESPIRATION RATE: 18 BRPM | DIASTOLIC BLOOD PRESSURE: 79 MMHG | HEIGHT: 67 IN | WEIGHT: 171.2 LBS | BODY MASS INDEX: 26.87 KG/M2 | OXYGEN SATURATION: 94 % | HEART RATE: 72 BPM | SYSTOLIC BLOOD PRESSURE: 130 MMHG

## 2024-07-10 DIAGNOSIS — E55.9 VITAMIN D DEFICIENCY: ICD-10-CM

## 2024-07-10 DIAGNOSIS — M25.552 BILATERAL HIP PAIN: ICD-10-CM

## 2024-07-10 DIAGNOSIS — Z00.00 HEALTHCARE MAINTENANCE: ICD-10-CM

## 2024-07-10 DIAGNOSIS — M79.7 FIBROMYALGIA: ICD-10-CM

## 2024-07-10 DIAGNOSIS — I10 ESSENTIAL (PRIMARY) HYPERTENSION: ICD-10-CM

## 2024-07-10 DIAGNOSIS — M25.551 BILATERAL HIP PAIN: ICD-10-CM

## 2024-07-10 DIAGNOSIS — M81.0 OSTEOPOROSIS, UNSPECIFIED OSTEOPOROSIS TYPE, UNSPECIFIED PATHOLOGICAL FRACTURE PRESENCE: ICD-10-CM

## 2024-07-10 DIAGNOSIS — Z87.448 HISTORY OF PROLAPSE OF BLADDER: ICD-10-CM

## 2024-07-10 PROBLEM — M81.8 OTHER OSTEOPOROSIS WITHOUT CURRENT PATHOLOGICAL FRACTURE: Status: ACTIVE | Noted: 2023-05-02

## 2024-07-10 PROCEDURE — 1036F TOBACCO NON-USER: CPT | Performed by: INTERNAL MEDICINE

## 2024-07-10 PROCEDURE — 3078F DIAST BP <80 MM HG: CPT | Performed by: INTERNAL MEDICINE

## 2024-07-10 PROCEDURE — 3075F SYST BP GE 130 - 139MM HG: CPT | Performed by: INTERNAL MEDICINE

## 2024-07-10 PROCEDURE — 1123F ACP DISCUSS/DSCN MKR DOCD: CPT | Performed by: INTERNAL MEDICINE

## 2024-07-10 PROCEDURE — 1160F RVW MEDS BY RX/DR IN RCRD: CPT | Performed by: INTERNAL MEDICINE

## 2024-07-10 PROCEDURE — 1159F MED LIST DOCD IN RCRD: CPT | Performed by: INTERNAL MEDICINE

## 2024-07-10 PROCEDURE — 99214 OFFICE O/P EST MOD 30 MIN: CPT | Performed by: INTERNAL MEDICINE

## 2024-07-10 RX ORDER — ALENDRONATE SODIUM 70 MG/1
70 TABLET ORAL
Qty: 4 TABLET | Refills: 0 | Status: SHIPPED | OUTPATIENT
Start: 2024-07-10 | End: 2025-07-10

## 2024-07-10 RX ORDER — TRAMADOL HYDROCHLORIDE 50 MG/1
25 TABLET ORAL EVERY 6 HOURS PRN
COMMUNITY

## 2024-07-10 RX ORDER — MULTIVITAMIN/IRON/FOLIC ACID 18MG-0.4MG
1 TABLET ORAL DAILY
Qty: 90 TABLET | Refills: 1 | Status: SHIPPED | OUTPATIENT
Start: 2024-07-10 | End: 2025-01-06

## 2024-07-10 ASSESSMENT — ENCOUNTER SYMPTOMS
RESPIRATORY NEGATIVE: 1
GASTROINTESTINAL NEGATIVE: 1
CONSTITUTIONAL NEGATIVE: 1
CARDIOVASCULAR NEGATIVE: 1
PSYCHIATRIC NEGATIVE: 1
NEUROLOGICAL NEGATIVE: 1
ARTHRALGIAS: 1

## 2024-07-10 NOTE — PROGRESS NOTES
"Patient ID: She states that she has had a very stressful time, as her  recently passed away in May. She states her son has been very helpful with everything. She states the Trazodone helps her sleep. She states every once in awhile, she will have a night where she gets very anxious(not even once per week). She states that she has certain times of the day where she misses her  more(early in morning and late in evenings especially). She states she has a wonderful family, support system. She states she is having hip pain, would like to return to physical therapy, as she has done PT in the past and it was effective. She states she is having a lot of muscle aches and soreness related to fibromyalgia. She states that she would like to get something to alert emergency medical staff other than her cell phone because she lives alone. She states she is also still having left leg edema, though not any worse. She would like to know of a good dermatologist as she has only seen in [place]. She also needs a referral for OB/Gyn for her prolapsed bladder, though she is not in a hurry for this. She states the Buspar does help with anxiety, but Valium was very effective.      BERNABE Baptiste is a 83 y.o. female with PMH remarkable for depression/anxiety, osteoporosis, HTN, who presents to the office today for follow up. She had dexa scan on 06/27/24 which revealed osteoporosis, she is not interested in Prolia injections any longer, would like to discuss Fosamax. She was seen in the office on 02/16/2024, was advised to stop Valium and was started on Buspar. She has multiple complaints,  \"Anxiety, stress, left knee swelling; Medical guardian; discuss vitamin B, GYN referral for prolapsed blatter, fibromyalgia, derm referral for body checked; would like to go to [facility] physical therapy for hip, muscle aches\"     HEALTH MAINTENANCE: FOLLOW UP   Last Office Visit: 2/16/24  Mammogram (40-75): out of range for her age  Pap " "smear (21-65, or hysterectomy q5yrs): n/a age  Last Labs: 5/24/23  Colonoscopy (45-75 or age 40 with 1st degree relative dx colon ca): 1/15/2020 cologuard  Lung cancer screening (50-76 y/o + 20 pack year + smoking/quit in last 15 years):   DEXA (65+, q 2 years): 06/27/2024 osteoporosis, not interested in Prolia injections any longer    Social History     Tobacco Use    Smoking status: Never     Passive exposure: Never    Smokeless tobacco: Never   Vaping Use    Vaping status: Never Used   Substance Use Topics    Alcohol use: Yes     Alcohol/week: 7.0 standard drinks of alcohol     Types: 7 Glasses of wine per week    Drug use: Never     Review of Systems   Constitutional: Negative.    HENT: Negative.     Respiratory: Negative.     Cardiovascular: Negative.    Gastrointestinal: Negative.    Genitourinary: Negative.    Musculoskeletal:  Positive for arthralgias.   Neurological: Negative.    Psychiatric/Behavioral: Negative.       Visit Vitals  Vitals:    07/10/24 0958   BP: 130/79   Pulse: 72   Resp: 18   SpO2: 94%   Weight: 77.7 kg (171 lb 3.2 oz)   Height: 1.689 m (5' 6.5\")      OB Status Postmenopausal   Smoking Status Never     Physical Exam  Vitals reviewed.   Constitutional:       Appearance: Normal appearance.   HENT:      Head: Normocephalic and atraumatic.   Cardiovascular:      Rate and Rhythm: Normal rate and regular rhythm.      Pulses: Normal pulses.      Heart sounds: Normal heart sounds.   Pulmonary:      Effort: Pulmonary effort is normal.      Breath sounds: Normal breath sounds.   Abdominal:      General: Bowel sounds are normal.      Palpations: Abdomen is soft.   Musculoskeletal:         General: Normal range of motion.      Left lower leg: Edema present.   Skin:     General: Skin is warm and dry.   Neurological:      General: No focal deficit present.      Mental Status: She is alert and oriented to person, place, and time.   Psychiatric:         Mood and Affect: Mood normal.         Behavior: " Behavior normal.         Current Outpatient Medications   Medication Instructions    amLODIPine (NORVASC) 5 mg, oral, Daily    busPIRone (BUSPAR) 10 mg, oral, 3 times daily PRN    cholecalciferol (VITAMIN D-3) 25 mcg, oral, Every 24 hours    docusate sodium (COLACE) 100 mg, oral, 2 times daily    fluticasone (Flonase) 50 mcg/actuation nasal spray 1 spray, Does not apply    metoprolol succinate XL (TOPROL-XL) 50 mg, oral, Daily    traZODone (DESYREL) 50 mg, oral, Nightly PRN      Lab Results   Component Value Date    WBC 6.4 05/24/2023    HGB 12.7 05/24/2023    HCT 41.6 05/24/2023     05/24/2023    CHOL 203 (H) 05/24/2023    TRIG 78 05/24/2023    HDL 56.3 05/24/2023    ALT 17 05/24/2023    AST 20 05/24/2023     05/24/2023    K 3.9 05/24/2023     05/24/2023    CREATININE 0.56 05/24/2023    BUN 19 05/24/2023    CO2 26 05/24/2023    TSH 1.76 05/24/2023    INR 1.0 07/10/2018     Problem List Items Addressed This Visit             ICD-10-CM    Other osteoporosis without current pathological fracture M81.8     She states he was on Prolia, had two injections, but does not want to be on it any longer  Discussed starting Fosamax weekly with patient, she is agreeable as she did not like the way she felt after receiving Prolia injections, though she can not recall specific symptoms she had  Discussed potential side effects  Discussed risk factors if osteoporosis left untreated  She is agreeable to start Fosamax, advised to call office immediately if she experiences any jaw or tooth pain         Relevant Medications    alendronate (Fosamax) 70 mg tablet    Vitamin D deficiency E55.9     Continue with vitamin D supplement         Fibromyalgia M79.7     She states she is having a lot of muscle aches, soreness with fibromyalgia  Discussed different treatment options with patient, including Cymbalta and gabapentin  She states she is used to living with it, has it managed with PRN Tylenol  She states that she is ok  to continue with PRN Tylenol, will inform us if pain worsens         Essential (primary) hypertension I10     BP today is ok 130/79  Continue with Amlodipine and Metoprolol         Healthcare maintenance Z00.00    Relevant Medications    b complex 0.4 mg tablet    Other Relevant Orders    Referral to Dermatology    Bilateral hip pain M25.551, M25.552    Relevant Orders    Referral to Physical Therapy    History of prolapse of bladder Z87.448    Relevant Orders    Referral to Urogynecology     --------------------  Written by Kirstie Cohen LPN, acting as a scribe for Dr. Maier. This note accurately reflects the work and decisions made by Dr. Maier.     I, Dr. Maier, attest all medical record entries made by the scribe were under my direction and were personally dictated by me. I have reviewed the chart and agree that the record accurately reflects my performance of the history, physical exam, and assessment and plan.

## 2024-07-10 NOTE — PATIENT INSTRUCTIONS
It was great to see you in the office today! Here is what we discussed at your visit today:  Please continue to take your current medications   We have sent prescriptions for Fosamax and B complex vitamins, take as directed  We have placed referral for urogynecology regarding your prolapsed bladder, Dr. Polanco   We have placed referral for physical therapy for your hip pain  We will refer you to [facility] in [place]:  Address: [facility] [address], [place] [address]  Phone: [phone]  Call [facility] at [phone]    Follow up before end of year for medicare wellness exam

## 2024-07-10 NOTE — ASSESSMENT & PLAN NOTE
She states she is having a lot of muscle aches, soreness with fibromyalgia  Discussed different treatment options with patient, including Cymbalta and gabapentin  She states she is used to living with it, has it managed with PRN Tylenol  She states that she is ok to continue with PRN Tylenol, will inform us if pain worsens

## 2024-07-10 NOTE — ASSESSMENT & PLAN NOTE
She states he was on Prolia, had two injections, but does not want to be on it any longer  Discussed starting Fosamax weekly with patient, she is agreeable as she did not like the way she felt after receiving Prolia injections, though she can not recall specific symptoms she had  Discussed potential side effects  Discussed risk factors if osteoporosis left untreated  She is agreeable to start Fosamax, advised to call office immediately if she experiences any jaw or tooth pain

## 2024-08-07 ENCOUNTER — APPOINTMENT (OUTPATIENT)
Dept: PRIMARY CARE | Facility: CLINIC | Age: 84
End: 2024-08-07
Payer: MEDICARE

## 2024-08-07 VITALS
WEIGHT: 172.6 LBS | BODY MASS INDEX: 27.09 KG/M2 | OXYGEN SATURATION: 94 % | DIASTOLIC BLOOD PRESSURE: 82 MMHG | SYSTOLIC BLOOD PRESSURE: 174 MMHG | HEIGHT: 67 IN | HEART RATE: 71 BPM | RESPIRATION RATE: 18 BRPM

## 2024-08-07 DIAGNOSIS — Z00.00 MEDICARE ANNUAL WELLNESS VISIT, SUBSEQUENT: ICD-10-CM

## 2024-08-07 DIAGNOSIS — Z87.448 HISTORY OF PROLAPSE OF BLADDER: ICD-10-CM

## 2024-08-07 DIAGNOSIS — F41.9 ANXIETY: ICD-10-CM

## 2024-08-07 DIAGNOSIS — I10 ESSENTIAL (PRIMARY) HYPERTENSION: ICD-10-CM

## 2024-08-07 DIAGNOSIS — H91.90 HEARING LOSS, UNSPECIFIED HEARING LOSS TYPE, UNSPECIFIED LATERALITY: ICD-10-CM

## 2024-08-07 DIAGNOSIS — R79.89 LOW VITAMIN B12 LEVEL: ICD-10-CM

## 2024-08-07 DIAGNOSIS — M81.8 OTHER OSTEOPOROSIS WITHOUT CURRENT PATHOLOGICAL FRACTURE: ICD-10-CM

## 2024-08-07 DIAGNOSIS — E55.9 VITAMIN D DEFICIENCY: ICD-10-CM

## 2024-08-07 PROCEDURE — 1160F RVW MEDS BY RX/DR IN RCRD: CPT | Performed by: INTERNAL MEDICINE

## 2024-08-07 PROCEDURE — 1170F FXNL STATUS ASSESSED: CPT | Performed by: INTERNAL MEDICINE

## 2024-08-07 PROCEDURE — 3077F SYST BP >= 140 MM HG: CPT | Performed by: INTERNAL MEDICINE

## 2024-08-07 PROCEDURE — 1036F TOBACCO NON-USER: CPT | Performed by: INTERNAL MEDICINE

## 2024-08-07 PROCEDURE — 1126F AMNT PAIN NOTED NONE PRSNT: CPT | Performed by: INTERNAL MEDICINE

## 2024-08-07 PROCEDURE — 1159F MED LIST DOCD IN RCRD: CPT | Performed by: INTERNAL MEDICINE

## 2024-08-07 PROCEDURE — 1123F ACP DISCUSS/DSCN MKR DOCD: CPT | Performed by: INTERNAL MEDICINE

## 2024-08-07 PROCEDURE — 3079F DIAST BP 80-89 MM HG: CPT | Performed by: INTERNAL MEDICINE

## 2024-08-07 PROCEDURE — 99214 OFFICE O/P EST MOD 30 MIN: CPT | Performed by: INTERNAL MEDICINE

## 2024-08-07 PROCEDURE — G0439 PPPS, SUBSEQ VISIT: HCPCS | Performed by: INTERNAL MEDICINE

## 2024-08-07 ASSESSMENT — ENCOUNTER SYMPTOMS
PSYCHIATRIC NEGATIVE: 1
NEUROLOGICAL NEGATIVE: 1
CONSTITUTIONAL NEGATIVE: 1
MUSCULOSKELETAL NEGATIVE: 1
GASTROINTESTINAL NEGATIVE: 1
RESPIRATORY NEGATIVE: 1
CARDIOVASCULAR NEGATIVE: 1

## 2024-08-07 ASSESSMENT — ACTIVITIES OF DAILY LIVING (ADL)
BATHING: INDEPENDENT
DOING_HOUSEWORK: INDEPENDENT
TAKING_MEDICATION: INDEPENDENT
MANAGING_FINANCES: INDEPENDENT
GROCERY_SHOPPING: INDEPENDENT
DRESSING: INDEPENDENT

## 2024-08-07 ASSESSMENT — PATIENT HEALTH QUESTIONNAIRE - PHQ9
1. LITTLE INTEREST OR PLEASURE IN DOING THINGS: NOT AT ALL
SUM OF ALL RESPONSES TO PHQ9 QUESTIONS 1 AND 2: 0
2. FEELING DOWN, DEPRESSED OR HOPELESS: NOT AT ALL

## 2024-08-07 ASSESSMENT — PAIN SCALES - GENERAL: PAINLEVEL: 0-NO PAIN
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
## 2024-08-07 NOTE — PATIENT INSTRUCTIONS
It was nice to see you today for your annual Medicare Wellness visit.  As discussed during our visit today ...  Please continue to take your current medications   Please get bloodwork drawn as soon as you are able. We will call you with results.  Try calling [facility] at [phone] to ask about their grief support services  Please get bloodwork drawn as soon as you are able. We will call you with results.  Follow up in four months

## 2024-08-08 PROBLEM — H91.90 HEARING LOSS: Status: ACTIVE | Noted: 2024-08-08

## 2024-08-08 NOTE — ASSESSMENT & PLAN NOTE
She admits she has not yet started Alendronate because she had so much going on after her  passed away  She states she will start on monday
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
## 2024-08-14 ENCOUNTER — LAB (OUTPATIENT)
Dept: LAB | Facility: LAB | Age: 84
End: 2024-08-14
Payer: MEDICARE

## 2024-08-14 DIAGNOSIS — R79.89 LOW VITAMIN B12 LEVEL: ICD-10-CM

## 2024-08-14 DIAGNOSIS — Z87.448 HISTORY OF PROLAPSE OF BLADDER: ICD-10-CM

## 2024-08-14 DIAGNOSIS — I10 ESSENTIAL (PRIMARY) HYPERTENSION: ICD-10-CM

## 2024-08-14 DIAGNOSIS — F41.9 ANXIETY: ICD-10-CM

## 2024-08-14 DIAGNOSIS — E55.9 VITAMIN D DEFICIENCY: ICD-10-CM

## 2024-08-14 LAB
25(OH)D3 SERPL-MCNC: 62 NG/ML (ref 30–100)
ALBUMIN SERPL BCP-MCNC: 3.7 G/DL (ref 3.4–5)
ALP SERPL-CCNC: 65 U/L (ref 33–136)
ALT SERPL W P-5'-P-CCNC: 13 U/L (ref 7–45)
ANION GAP SERPL CALC-SCNC: 10 MMOL/L (ref 10–20)
AST SERPL W P-5'-P-CCNC: 17 U/L (ref 9–39)
BASOPHILS # BLD AUTO: 0.05 X10*3/UL (ref 0–0.1)
BASOPHILS NFR BLD AUTO: 0.8 %
BILIRUB SERPL-MCNC: 0.5 MG/DL (ref 0–1.2)
BUN SERPL-MCNC: 16 MG/DL (ref 6–23)
CALCIUM SERPL-MCNC: 9 MG/DL (ref 8.6–10.3)
CHLORIDE SERPL-SCNC: 103 MMOL/L (ref 98–107)
CHOLEST SERPL-MCNC: 185 MG/DL (ref 0–199)
CHOLESTEROL/HDL RATIO: 3.7
CO2 SERPL-SCNC: 31 MMOL/L (ref 21–32)
CREAT SERPL-MCNC: 0.66 MG/DL (ref 0.5–1.05)
EGFRCR SERPLBLD CKD-EPI 2021: 86 ML/MIN/1.73M*2
EOSINOPHIL # BLD AUTO: 0.13 X10*3/UL (ref 0–0.4)
EOSINOPHIL NFR BLD AUTO: 2.1 %
ERYTHROCYTE [DISTWIDTH] IN BLOOD BY AUTOMATED COUNT: 13.4 % (ref 11.5–14.5)
GLUCOSE SERPL-MCNC: 101 MG/DL (ref 74–99)
HCT VFR BLD AUTO: 39.8 % (ref 36–46)
HDLC SERPL-MCNC: 50.6 MG/DL
HGB BLD-MCNC: 12.5 G/DL (ref 12–16)
IMM GRANULOCYTES # BLD AUTO: 0.02 X10*3/UL (ref 0–0.5)
IMM GRANULOCYTES NFR BLD AUTO: 0.3 % (ref 0–0.9)
LDLC SERPL CALC-MCNC: 120 MG/DL
LYMPHOCYTES # BLD AUTO: 1.51 X10*3/UL (ref 0.8–3)
LYMPHOCYTES NFR BLD AUTO: 23.8 %
MCH RBC QN AUTO: 31.8 PG (ref 26–34)
MCHC RBC AUTO-ENTMCNC: 31.4 G/DL (ref 32–36)
MCV RBC AUTO: 101 FL (ref 80–100)
MONOCYTES # BLD AUTO: 0.6 X10*3/UL (ref 0.05–0.8)
MONOCYTES NFR BLD AUTO: 9.5 %
NEUTROPHILS # BLD AUTO: 4.03 X10*3/UL (ref 1.6–5.5)
NEUTROPHILS NFR BLD AUTO: 63.5 %
NON HDL CHOLESTEROL: 134 MG/DL (ref 0–149)
NRBC BLD-RTO: 0 /100 WBCS (ref 0–0)
PLATELET # BLD AUTO: 280 X10*3/UL (ref 150–450)
POTASSIUM SERPL-SCNC: 4.3 MMOL/L (ref 3.5–5.3)
PROT SERPL-MCNC: 6.8 G/DL (ref 6.4–8.2)
RBC # BLD AUTO: 3.93 X10*6/UL (ref 4–5.2)
SODIUM SERPL-SCNC: 140 MMOL/L (ref 136–145)
TRIGL SERPL-MCNC: 72 MG/DL (ref 0–149)
TSH SERPL-ACNC: 1.28 MIU/L (ref 0.44–3.98)
VIT B12 SERPL-MCNC: 188 PG/ML (ref 211–911)
VLDL: 14 MG/DL (ref 0–40)
WBC # BLD AUTO: 6.3 X10*3/UL (ref 4.4–11.3)

## 2024-08-14 PROCEDURE — 82306 VITAMIN D 25 HYDROXY: CPT

## 2024-08-14 PROCEDURE — 36415 COLL VENOUS BLD VENIPUNCTURE: CPT

## 2024-08-14 PROCEDURE — 82607 VITAMIN B-12: CPT

## 2024-08-21 ENCOUNTER — APPOINTMENT (OUTPATIENT)
Dept: PRIMARY CARE | Facility: CLINIC | Age: 84
End: 2024-08-21
Payer: MEDICARE

## 2024-08-23 DIAGNOSIS — F41.9 ANXIETY: ICD-10-CM

## 2024-08-23 RX ORDER — BUSPIRONE HYDROCHLORIDE 10 MG/1
10 TABLET ORAL 3 TIMES DAILY PRN
Qty: 90 TABLET | Refills: 0 | Status: SHIPPED | OUTPATIENT
Start: 2024-08-23

## 2024-08-26 ENCOUNTER — APPOINTMENT (OUTPATIENT)
Dept: UROLOGY | Facility: CLINIC | Age: 84
End: 2024-08-26
Payer: MEDICARE

## 2024-09-09 ENCOUNTER — APPOINTMENT (OUTPATIENT)
Dept: UROLOGY | Facility: CLINIC | Age: 84
End: 2024-09-09
Payer: MEDICARE

## 2024-09-09 VITALS
DIASTOLIC BLOOD PRESSURE: 88 MMHG | HEART RATE: 73 BPM | SYSTOLIC BLOOD PRESSURE: 136 MMHG | BODY MASS INDEX: 27.32 KG/M2 | TEMPERATURE: 97.9 F | HEIGHT: 66 IN | WEIGHT: 170 LBS

## 2024-09-09 DIAGNOSIS — N95.2 VAGINAL ATROPHY: ICD-10-CM

## 2024-09-09 DIAGNOSIS — N81.89 PELVIC FLOOR WEAKNESS: ICD-10-CM

## 2024-09-09 DIAGNOSIS — Z87.448 HISTORY OF PROLAPSE OF BLADDER: ICD-10-CM

## 2024-09-09 DIAGNOSIS — N81.11 CYSTOCELE, MIDLINE: ICD-10-CM

## 2024-09-09 DIAGNOSIS — N39.41 URGE INCONTINENCE: Primary | ICD-10-CM

## 2024-09-09 LAB
POC APPEARANCE, URINE: CLEAR
POC BILIRUBIN, URINE: NEGATIVE
POC BLOOD, URINE: NEGATIVE
POC COLOR, URINE: YELLOW
POC GLUCOSE, URINE: NEGATIVE MG/DL
POC KETONES, URINE: NEGATIVE MG/DL
POC LEUKOCYTES, URINE: ABNORMAL
POC NITRITE,URINE: NEGATIVE
POC PH, URINE: 7.5 PH
POC PROTEIN, URINE: NEGATIVE MG/DL
POC SPECIFIC GRAVITY, URINE: 1.01
POC UROBILINOGEN, URINE: 0.2 EU/DL

## 2024-09-09 PROCEDURE — 1123F ACP DISCUSS/DSCN MKR DOCD: CPT | Performed by: OBSTETRICS & GYNECOLOGY

## 2024-09-09 PROCEDURE — 1036F TOBACCO NON-USER: CPT | Performed by: OBSTETRICS & GYNECOLOGY

## 2024-09-09 PROCEDURE — 1160F RVW MEDS BY RX/DR IN RCRD: CPT | Performed by: OBSTETRICS & GYNECOLOGY

## 2024-09-09 PROCEDURE — 3075F SYST BP GE 130 - 139MM HG: CPT | Performed by: OBSTETRICS & GYNECOLOGY

## 2024-09-09 PROCEDURE — 1159F MED LIST DOCD IN RCRD: CPT | Performed by: OBSTETRICS & GYNECOLOGY

## 2024-09-09 PROCEDURE — 1126F AMNT PAIN NOTED NONE PRSNT: CPT | Performed by: OBSTETRICS & GYNECOLOGY

## 2024-09-09 PROCEDURE — 99204 OFFICE O/P NEW MOD 45 MIN: CPT | Performed by: OBSTETRICS & GYNECOLOGY

## 2024-09-09 PROCEDURE — 81003 URINALYSIS AUTO W/O SCOPE: CPT | Performed by: OBSTETRICS & GYNECOLOGY

## 2024-09-09 PROCEDURE — 3079F DIAST BP 80-89 MM HG: CPT | Performed by: OBSTETRICS & GYNECOLOGY

## 2024-09-09 RX ORDER — ESTRADIOL 0.1 MG/G
CREAM VAGINAL
Qty: 42.5 G | Refills: 3 | Status: SHIPPED | OUTPATIENT
Start: 2024-09-09

## 2024-09-09 ASSESSMENT — PAIN SCALES - GENERAL: PAINLEVEL: 0-NO PAIN

## 2024-09-09 NOTE — PROGRESS NOTES
Subjective   Patient ID: Alecia Baptiste is a 83 y.o. female who presents for  urinary urgency, frequency, incontinence and pelvic organ prolapse.     \[facility] [place] [facility]\""  83 y.o.  patient presenting as a referral from Dr. Dipti Lawson  with complaints of urinary urgency, frequency, incontinence and pelvic organ prolapse.     The patient presents with a longstanding history of urinary urgency and frequency complaints. She notes 2-3 episodes of nocturia but denies nay enuresis. She voids every 2 hours during the day with episodes of incontinence in between. She has to wear pads to avoid accident. She denies any leaking with laughing, coughing and sneezing. She denies any History of nephrolithiasis, gross hematuria or chronic recurrent UTIs.    She recently lost her  in May 2024.     She was diagnosed to have a bulge on pelvic exam but denies any bulge complaints, no pressure or pulling. She has a distant history of having a pessary fitted but it did not do anything for her vaginal or lower urinary tract complaints. She is not sexually active. She denies any vaginal complaints, no abnormal vaginal bleeding or discharge. She denies any vaginal dryness or irritation.     She denies any bowel related complaints, no fecal or flatal incontinence.    She has no other complaints.    Review of Systems  Constitutional: No fever, No chills and No fatigue.   Eyes: No vision problems and No dryness of the eyes.   ENT: No dry mouth, No hearing loss and No nosebleeds.   Cardiovascular: No chest pain, No palpitations and No orthopnea.   Respiratory: No shortness of breath, No cough and No wheezing.   Gastrointestinal: No abdominal pain, No constipation, No nausea, No diarrhea, No vomiting and No melena.   Genitourinary: As noted in HPI.   Musculoskeletal: No back pain, No myalgias, No muscle weakness, No joint swelling and No leg edema.   Integumentary: No rashes, No skin lesion and No itching.   Neurological: No headache, No numbness and No  dizziness.   Psychiatric: No sleep disturbances, No anxiety and No depression.   Endocrine: No hot flashes, No loss of hair and No hirsutism.   Hematologic/Lymphatic: No swollen glands, No tendency for easy bleeding and No tendency for easy bruising.   All other systems have been reviewed and are negative for complaint.        Objective   Physical Exam  PHYSICAL EXAMINATION:  No LMP recorded. Patient is postmenopausal.  There is no height or weight on file to calculate BMI.  There were no vitals taken for this visit.  General Appearance: well appearing  Neuro: Alert and oriented   HEENT: mucous membranes moist, neck supple  Resp: No respiratory distress, normal work of breathing  MSK: normal range of motion, gait appropriate    Pelvic:  Genitourinary:  normal external genitalia, Bartholin's glands negative, [place]'s glands negative  Urethra   normal meatus, non-tender, no periurethral mass  Vaginal mucosa  normal  Cervix  normal  Uterus  normal size, non-tender, mobile  Adnexae  negative nontender, no masses  Atrophy positive    CST negative  Pelvic floor muscle contraction  2/5, no pain throughout exam    POP-Q (in supine position)       Aa +1     Ba =+1     C -4              gh 4     pb 3     tvl 9              Ap -3     Bp -3     D -5    Rectal: no hemorrhoids, fissures or masses    Assessment/Plan   83-year-old with asymptomatic stage II cystocele and mild apical descent, urge urinary incontinence, pelvic floor weakness, and vaginal atrophy.    1.  The patient is otherwise asymptomatic from her prolapse complaints.  She has previously utilized a pessary without significant benefits and with pain.  We discussed expectant management.    2.  We discussed the patient's urge urinary incontinence complaints.  We discussed fluid management strategies and timed voiding.  She is presently proceeding with physical therapy for her hip and knee.  We discussed the potential benefits of pelvic floor physical therapy and she  was provided a list of pelvic floor physical therapists.  We also discussed medication therapies.  She was provided samples for Gemtesa and will start this now.  We discussed stopping his medication immediately should she have any bothersome side effects.    3.  We discussed the patient's vaginal atrophy.  We discussed the safety, efficacy, proper utilization of vaginal estrogen therapy and she will start this now.    4.  The patient will follow-up in 6 weeks to discuss her lower urinary tract complaints.    RACHAEL Driver MD      Scribe Attestation  By signing my name below, IMandi Scribe attest that this documentation has been prepared under the direction and in the presence of Hadley Driver MD. All medical record entries made by the Scribe were at my direction or personally dictated by me. I have reviewed the chart and agree that the record accurately reflects my personal performance of the history, physical exam, discussion and plan.

## 2024-09-09 NOTE — PATIENT INSTRUCTIONS
Please start your vaginal estrogen therapy nightly for 2 weeks and then 3 times a week thereafter. Do not use the plastic applicator and only use your fingertip.     Please follow-up with pelvic floor physical therapy at your  convenience. You have been provided a referral as well as a list of providers.     Please start your Gemtesa  therapy.Please stop this medication immediately should you have any bothersome side effects.      Please follow-up 4-6 weeks to discuss your lower urinary tract symptoms.    Call the clinic with questions or concerns.    [phone]

## 2024-09-16 DIAGNOSIS — I10 BENIGN ESSENTIAL HTN: ICD-10-CM

## 2024-09-16 RX ORDER — METOPROLOL SUCCINATE 50 MG/1
50 TABLET, EXTENDED RELEASE ORAL DAILY
Qty: 90 TABLET | Refills: 0 | Status: SHIPPED | OUTPATIENT
Start: 2024-09-16

## 2024-09-26 ENCOUNTER — TELEPHONE (OUTPATIENT)
Dept: UROLOGY | Facility: CLINIC | Age: 84
End: 2024-09-26

## 2024-10-04 ENCOUNTER — TELEPHONE (OUTPATIENT)
Dept: PRIMARY CARE | Facility: CLINIC | Age: 84
End: 2024-10-04
Payer: MEDICARE

## 2024-10-04 NOTE — TELEPHONE ENCOUNTER
Patient had covid vaccine yesterday and is having a lot pain at the injection site, muscle aches and chills. She is using tylenol. Any other suggestions

## 2024-10-07 ENCOUNTER — APPOINTMENT (OUTPATIENT)
Dept: AUDIOLOGY | Facility: CLINIC | Age: 84
End: 2024-10-07
Payer: MEDICARE

## 2024-10-15 DIAGNOSIS — I10 HYPERTENSION, ISOLATED SYSTOLIC: ICD-10-CM

## 2024-10-15 RX ORDER — AMLODIPINE BESYLATE 5 MG/1
5 TABLET ORAL DAILY
Qty: 90 TABLET | Refills: 1 | Status: SHIPPED | OUTPATIENT
Start: 2024-10-15

## 2024-10-21 ENCOUNTER — APPOINTMENT (OUTPATIENT)
Dept: UROLOGY | Facility: CLINIC | Age: 84
End: 2024-10-21
Payer: MEDICARE

## 2024-10-29 ENCOUNTER — APPOINTMENT (OUTPATIENT)
Dept: AUDIOLOGY | Facility: CLINIC | Age: 84
End: 2024-10-29
Payer: MEDICARE

## 2024-10-30 DIAGNOSIS — F51.01 PRIMARY INSOMNIA: ICD-10-CM

## 2024-10-30 RX ORDER — TRAZODONE HYDROCHLORIDE 50 MG/1
50 TABLET ORAL NIGHTLY PRN
Qty: 30 TABLET | Refills: 2 | Status: SHIPPED | OUTPATIENT
Start: 2024-10-30 | End: 2025-10-30

## 2024-10-31 ENCOUNTER — CLINICAL SUPPORT (OUTPATIENT)
Dept: AUDIOLOGY | Facility: CLINIC | Age: 84
End: 2024-10-31
Payer: MEDICARE

## 2024-10-31 DIAGNOSIS — H90.3 SENSORINEURAL HEARING LOSS (SNHL) OF BOTH EARS: Primary | ICD-10-CM

## 2024-10-31 DIAGNOSIS — H91.90 HEARING LOSS, UNSPECIFIED HEARING LOSS TYPE, UNSPECIFIED LATERALITY: ICD-10-CM

## 2024-10-31 PROCEDURE — 92557 COMPREHENSIVE HEARING TEST: CPT | Performed by: SOCIAL WORKER

## 2024-10-31 PROCEDURE — 92550 TYMPANOMETRY & REFLEX THRESH: CPT | Performed by: SOCIAL WORKER

## 2024-10-31 ASSESSMENT — ENCOUNTER SYMPTOMS
OCCASIONAL FEELINGS OF UNSTEADINESS: 1
LOSS OF SENSATION IN FEET: 0

## 2024-10-31 ASSESSMENT — PAIN - FUNCTIONAL ASSESSMENT: PAIN_FUNCTIONAL_ASSESSMENT: 0-10

## 2024-10-31 ASSESSMENT — PAIN SCALES - GENERAL: PAINLEVEL_OUTOF10: 0 - NO PAIN

## 2024-11-06 ENCOUNTER — APPOINTMENT (OUTPATIENT)
Dept: PRIMARY CARE | Facility: CLINIC | Age: 84
End: 2024-11-06
Payer: MEDICARE

## 2024-11-06 DIAGNOSIS — H61.22 IMPACTED CERUMEN OF LEFT EAR: ICD-10-CM

## 2024-11-06 PROCEDURE — 69209 REMOVE IMPACTED EAR WAX UNI: CPT | Performed by: INTERNAL MEDICINE

## 2024-11-06 NOTE — PROGRESS NOTES
Patient ID: Alecia Baptiste is a 84 y.o. female.    Ear Cerumen Removal    Date/Time: 11/6/2024 10:44 AM    Performed by: Hetal Hidalgo LPN  Authorized by: Dipti Mitchell MD    Consent:     Consent obtained:  Verbal    Consent given by:  Patient  Universal protocol:     Procedure explained and questions answered to patient or proxy's satisfaction: yes      Patient identity confirmed:  Verbally with patient  Procedure details:     Location:  L ear    Procedure type: irrigation      Procedure outcomes: cerumen removed    Post-procedure details:     Inspection:  Ear canal clear    Procedure completion:  Tolerated  Comments:      Patient is going to get hearing aides- needed cerumen removal before having the hearing aides fitted.

## 2024-11-11 ENCOUNTER — APPOINTMENT (OUTPATIENT)
Dept: DERMATOLOGY | Facility: CLINIC | Age: 84
End: 2024-11-11
Payer: MEDICARE
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
## 2024-11-22 ENCOUNTER — APPOINTMENT (OUTPATIENT)
Dept: PRIMARY CARE | Facility: CLINIC | Age: 84
End: 2024-11-22
Payer: MEDICARE

## 2024-11-22 VITALS
RESPIRATION RATE: 18 BRPM | BODY MASS INDEX: 27.97 KG/M2 | OXYGEN SATURATION: 94 % | HEIGHT: 66 IN | WEIGHT: 174 LBS | SYSTOLIC BLOOD PRESSURE: 164 MMHG | HEART RATE: 88 BPM | DIASTOLIC BLOOD PRESSURE: 96 MMHG

## 2024-11-22 DIAGNOSIS — N81.10 FEMALE BLADDER PROLAPSE: ICD-10-CM

## 2024-11-22 DIAGNOSIS — Z23 FLU VACCINE NEED: ICD-10-CM

## 2024-11-22 DIAGNOSIS — M79.7 FIBROMYALGIA: ICD-10-CM

## 2024-11-22 DIAGNOSIS — I10 ESSENTIAL (PRIMARY) HYPERTENSION: ICD-10-CM

## 2024-11-22 DIAGNOSIS — Z00.00 HEALTH CARE MAINTENANCE: ICD-10-CM

## 2024-11-22 DIAGNOSIS — M81.8 OTHER OSTEOPOROSIS WITHOUT CURRENT PATHOLOGICAL FRACTURE: ICD-10-CM

## 2024-11-22 DIAGNOSIS — F41.9 ANXIETY: ICD-10-CM

## 2024-11-22 PROCEDURE — 1160F RVW MEDS BY RX/DR IN RCRD: CPT | Performed by: INTERNAL MEDICINE

## 2024-11-22 PROCEDURE — 90662 IIV NO PRSV INCREASED AG IM: CPT | Performed by: INTERNAL MEDICINE

## 2024-11-22 PROCEDURE — 1036F TOBACCO NON-USER: CPT | Performed by: INTERNAL MEDICINE

## 2024-11-22 PROCEDURE — 1126F AMNT PAIN NOTED NONE PRSNT: CPT | Performed by: INTERNAL MEDICINE

## 2024-11-22 PROCEDURE — 99213 OFFICE O/P EST LOW 20 MIN: CPT | Performed by: INTERNAL MEDICINE

## 2024-11-22 PROCEDURE — 3079F DIAST BP 80-89 MM HG: CPT | Performed by: INTERNAL MEDICINE

## 2024-11-22 PROCEDURE — 1159F MED LIST DOCD IN RCRD: CPT | Performed by: INTERNAL MEDICINE

## 2024-11-22 PROCEDURE — 3077F SYST BP >= 140 MM HG: CPT | Performed by: INTERNAL MEDICINE

## 2024-11-22 PROCEDURE — G0008 ADMIN INFLUENZA VIRUS VAC: HCPCS | Performed by: INTERNAL MEDICINE

## 2024-11-22 PROCEDURE — 1123F ACP DISCUSS/DSCN MKR DOCD: CPT | Performed by: INTERNAL MEDICINE

## 2024-11-22 ASSESSMENT — PATIENT HEALTH QUESTIONNAIRE - PHQ9
1. LITTLE INTEREST OR PLEASURE IN DOING THINGS: NOT AT ALL
2. FEELING DOWN, DEPRESSED OR HOPELESS: SEVERAL DAYS
SUM OF ALL RESPONSES TO PHQ9 QUESTIONS 1 AND 2: 1

## 2024-11-22 ASSESSMENT — ENCOUNTER SYMPTOMS
CARDIOVASCULAR NEGATIVE: 1
NEUROLOGICAL NEGATIVE: 1
CONSTITUTIONAL NEGATIVE: 1
ENDOCRINE NEGATIVE: 1
RESPIRATORY NEGATIVE: 1
GASTROINTESTINAL NEGATIVE: 1
EYES NEGATIVE: 1

## 2024-11-22 ASSESSMENT — PAIN SCALES - GENERAL: PAINLEVEL_OUTOF10: 0-NO PAIN

## 2024-11-22 NOTE — PATIENT INSTRUCTIONS
"It was nice to see you in the office today!  As discussed during our visit...     Let us know if you do want to try the muscle relaxer in the future.    Take the buspar 10mg at night when/if you wake up to go to the bathroom to try to decrease anxiety in the morning.    Monitor your blood pressure and heart rate three times a week for three weeks (and if you are feeling \"off\")  - keep a log of your blood pressure and heart rate readings  - Call the office or send a \"[employer]\" message with your readings for your provider to review.   -----------------------------------------  "

## 2024-11-25 ENCOUNTER — CLINICAL SUPPORT (OUTPATIENT)
Dept: AUDIOLOGY | Facility: CLINIC | Age: 84
End: 2024-11-25

## 2024-11-25 DIAGNOSIS — H90.3 SENSORINEURAL HEARING LOSS (SNHL) OF BOTH EARS: ICD-10-CM

## 2024-11-25 PROCEDURE — 92700 UNLISTED ORL SERVICE/PX: CPT | Mod: AUDSP | Performed by: SOCIAL WORKER

## 2024-11-25 ASSESSMENT — ENCOUNTER SYMPTOMS
MYALGIAS: 1
SLEEP DISTURBANCE: 1
NERVOUS/ANXIOUS: 1

## 2024-11-25 NOTE — ASSESSMENT & PLAN NOTE
- pt saw Dr Driver in the past (LOV 9/9/2024)  - reviewed documentation  - c/w vibegron- tolerating well

## 2024-11-25 NOTE — ASSESSMENT & PLAN NOTE
- inquiring about muscle relaxer for fibromyalgia- she is anticipating it to flareup with the weather change.   - discussed pros and cons of the muscle relaxer - including altered mental state. Will avoid at this time.  - Let us know if you do want to try the muscle relaxer in the future.  - has been on flexeril in the past

## 2024-11-25 NOTE — ASSESSMENT & PLAN NOTE
- c/w amlodipine, metoprolol  Visit Vitals  BP (!) 164/96   Pulse 88   Resp 18    - All strategies to keep the blood pressure down is explained to the patient  - Regular exercise and blood pressure monitoring is encouraged  - Education and counseling was given to the patient  - advised to monitor blood pressure three times a week for three weeks and then let us know how it runs (send a [employer] [facility] message of a picture of the readings, drop recordings off to the office and/or call with them).

## 2024-11-25 NOTE — PROGRESS NOTES
Alecia Baptiste was seen on 11/25/24 for a hearing aid consultation and device demonstration  Most recent audiogram was 10/31/24    Consult:  The Hearing Handicap Inventory for Adults (HHIA) was performed today. Alecia obtained a score of 6 out of 40 indicating little or no perceived hearing handicap  Audiogram Direct was performed today.    Due to patient's hearing loss and lifestyle mid range BIA aids are recommended in silver color  Size 2 standard receivers and small vented domes are recommended  Realistic expectations were discussed  Alecia was informed of the Hearing aid warranty period  30 day trial period was discussed  Device demonstration occurred in office  A set of trial Audeo L90R aids were programmed for use today.  Patient was encouraged to wear them during her daily activities for the next 1-2 weeks and return for purchase of devices    Treatment Plan:  Return in 2-3 weeks for hearing aid order  Retest hearing annually to monitor hearing loss

## 2024-11-25 NOTE — ASSESSMENT & PLAN NOTE
- pt's  passed away in May 2024. Now lives alone and is having some difficulty adjusting.  - has family nearby that helps  - feels most anxiety in the morning when she wakes up   - advised to take the buspar 10mg at night when/if you wake up to go to the bathroom to try to decrease anxiety in the morning.  - c/w trazodone and melatonin for sleep

## 2024-11-26 ENCOUNTER — TELEMEDICINE (OUTPATIENT)
Dept: UROLOGY | Facility: CLINIC | Age: 84
End: 2024-11-26
Payer: MEDICARE

## 2024-11-26 DIAGNOSIS — N81.11 CYSTOCELE, MIDLINE: ICD-10-CM

## 2024-11-26 DIAGNOSIS — N39.41 URGE INCONTINENCE: Primary | ICD-10-CM

## 2024-11-26 DIAGNOSIS — N81.89 PELVIC FLOOR WEAKNESS: ICD-10-CM

## 2024-11-26 DIAGNOSIS — N95.2 VAGINAL ATROPHY: ICD-10-CM

## 2024-11-26 PROCEDURE — 1036F TOBACCO NON-USER: CPT | Performed by: OBSTETRICS & GYNECOLOGY

## 2024-11-26 PROCEDURE — 99441 PR PHYS/QHP TELEPHONE EVALUATION 5-10 MIN: CPT | Performed by: OBSTETRICS & GYNECOLOGY

## 2024-11-26 PROCEDURE — 1159F MED LIST DOCD IN RCRD: CPT | Performed by: OBSTETRICS & GYNECOLOGY

## 2024-11-26 PROCEDURE — 1123F ACP DISCUSS/DSCN MKR DOCD: CPT | Performed by: OBSTETRICS & GYNECOLOGY

## 2024-11-26 PROCEDURE — 1160F RVW MEDS BY RX/DR IN RCRD: CPT | Performed by: OBSTETRICS & GYNECOLOGY

## 2024-11-26 RX ORDER — ESTRADIOL 0.1 MG/G
CREAM VAGINAL
Qty: 42.5 G | Refills: 3 | Status: SHIPPED | OUTPATIENT
Start: 2024-11-26

## 2024-11-26 NOTE — PROGRESS NOTES
Subjective   Patient ID: Alecia Baptiste is a 84 y.o. female who presents for  urinary urgency, frequency, incontinence and pelvic organ prolapse.   Virtual or Telephone Consent    An interactive audio and video telecommunication system which permits real time communications between the patient (at the originating site) and provider (at the distant site) was utilized to provide this telehealth service.   Verbal consent was requested and obtained from Alecia Baptiste on this date, 11/26/24 for a telehealth visit.   9 minutes were spent discussing the patients concerns and coordinating care    HPI  This visit was performed through telemedicine  84-year-old with asymptomatic stage II cystocele and mild apical descent, urge urinary incontinence, pelvic floor weakness, and vaginal atrophy.    The patient has been utilizing her Gemtesa therapy for the past 3 weeks with significant benefits.  She notes 1-2 episodes of nocturia but denies any enuresis. She voids every 2-3 hour during the day without any incontinence episodes. She denies any UTI like symptoms.     She denies any vaginal complaints, no abnormal bleeding or discharge. She is not utilizing the vaginal estrogen cream.     She denies any bowel related complaints, no fecal or flatal incontinence.    She has no other complaints.        From Previous note  84 y.o.  patient presenting as a referral from Dr. Dipti Lawson  with complaints of urinary urgency, frequency, incontinence and pelvic organ prolapse.     The patient presents with a longstanding history of urinary urgency and frequency complaints. She notes 2-3 episodes of nocturia but denies nay enuresis. She voids every 2 hours during the day with episodes of incontinence in between. She has to wear pads to avoid accident. She denies any leaking with laughing, coughing and sneezing. She denies any History of nephrolithiasis, gross hematuria or chronic recurrent UTIs.    She recently lost her  in May 2024.      She was diagnosed to have a bulge on pelvic exam but denies any bulge complaints, no pressure or pulling. She has a distant history of having a pessary fitted but it did not do anything for her vaginal or lower urinary tract complaints. She is not sexually active. She denies any vaginal complaints, no abnormal vaginal bleeding or discharge. She denies any vaginal dryness or irritation.     She denies any bowel related complaints, no fecal or flatal incontinence.    She has no other complaints.    Review of Systems  Constitutional: No fever, No chills and No fatigue.   Eyes: No vision problems and No dryness of the eyes.   ENT: No dry mouth, No hearing loss and No nosebleeds.   Cardiovascular: No chest pain, No palpitations and No orthopnea.   Respiratory: No shortness of breath, No cough and No wheezing.   Gastrointestinal: No abdominal pain, No constipation, No nausea, No diarrhea, No vomiting and No melena.   Genitourinary: As noted in HPI.   Musculoskeletal: No back pain, No myalgias, No muscle weakness, No joint swelling and No leg edema.   Integumentary: No rashes, No skin lesion and No itching.   Neurological: No headache, No numbness and No dizziness.   Psychiatric: No sleep disturbances, No anxiety and No depression.   Endocrine: No hot flashes, No loss of hair and No hirsutism.   Hematologic/Lymphatic: No swollen glands, No tendency for easy bleeding and No tendency for easy bruising.   All other systems have been reviewed and are negative for complaint.        Objective   Physical Exam  This visit was performed through telemedicine     Assessment/Plan   84-year-old with asymptomatic stage II cystocele and mild apical descent, urge urinary incontinence, pelvic floor weakness, and vaginal atrophy.    1.  The patient is otherwise asymptomatic from her prolapse complaints.  She has previously utilized a pessary without significant benefits and with pain.  We discussed expectant management.  She is not  interested in surgical management at this time.    2.  We again discussed the patient's urge urinary incontinence complaints.  We discussed fluid management strategies and timed voiding.  She is presently proceeding with physical therapy for her hip and knee.  We discussed the potential benefits of pelvic floor physical therapy and she was previously provided a list of pelvic floor physical therapists.  She does appear to be having significant benefits with her Gemtesa therapy and will continue this moving forward.  A new prescription was called into her pharmacy.    3.  We discussed the patient's vaginal atrophy.  We discussed the safety, efficacy, proper utilization of vaginal estrogen therapy and she will continue this 3 times a week moving forward.    4.  The patient will follow-up yearly moving forward.    RACHAEL Driver MD      Scribe Attestation  By signing my name below, I, Shahab Manley attest that this documentation has been prepared under the direction and in the presence of Hadley Driver MD. All medical record entries made by the Scribe were at my direction or personally dictated by me. I have reviewed the chart and agree that the record accurately reflects my personal performance of the history, physical exam, discussion and plan.

## 2024-12-09 ENCOUNTER — CLINICAL SUPPORT (OUTPATIENT)
Dept: AUDIOLOGY | Facility: CLINIC | Age: 84
End: 2024-12-09

## 2024-12-09 DIAGNOSIS — H90.3 SENSORINEURAL HEARING LOSS (SNHL) OF BOTH EARS: ICD-10-CM

## 2024-12-09 DIAGNOSIS — H90.3 SENSORINEURAL HEARING LOSS, BILATERAL: ICD-10-CM

## 2024-12-09 PROCEDURE — V5160 DISPENSING FEE BINAURAL: HCPCS | Mod: AUDSP | Performed by: SOCIAL WORKER

## 2024-12-09 PROCEDURE — V5261 HEARING AID, DIGIT, BIN, BTE: HCPCS | Mod: AUDSP | Performed by: SOCIAL WORKER

## 2024-12-09 NOTE — PROGRESS NOTES
Alecia was seen on 12/09/24 to return Clinic trial Audeo L90R aids  She is ready to purchase her own hearing aids after completing the trial    Alecia Baptiste was seen on 11/25/24 for a hearing aid consultation and device demonstration  Most recent audiogram was 10/31/24     Consult 11/25/24:  The Hearing Handicap Inventory for Adults (HHIA) was performed today. Alecia obtained a score of 6 out of 40 indicating little or no perceived hearing handicap  Audiogram Direct was performed today.     Due to patient's hearing loss and lifestyle mid range BIA aids are recommended in silver color (Audeo L70 R, 1 M R and 1 M L receivers)  Size 1 receivers and small vented domes are recommended  Realistic expectations were discussed  Alecia was informed of the Hearing aid warranty period  30 day trial period was discussed  Patient paid for total charges in full today to receive [employer] discount    Treatment Plan:  Return in 3 weeks for hearing aid programming and dispense  Annual audiograms

## 2024-12-10 DIAGNOSIS — I10 BENIGN ESSENTIAL HTN: ICD-10-CM

## 2024-12-10 RX ORDER — METOPROLOL SUCCINATE 50 MG/1
50 TABLET, EXTENDED RELEASE ORAL DAILY
Qty: 90 TABLET | Refills: 0 | Status: SHIPPED | OUTPATIENT
Start: 2024-12-10

## 2024-12-20 ENCOUNTER — TELEPHONE (OUTPATIENT)
Dept: UROLOGY | Facility: CLINIC | Age: 84
End: 2024-12-20

## 2025-01-02 DIAGNOSIS — N39.41 URGE INCONTINENCE: Primary | ICD-10-CM

## 2025-01-06 ENCOUNTER — CLINICAL SUPPORT (OUTPATIENT)
Dept: AUDIOLOGY | Facility: CLINIC | Age: 85
End: 2025-01-06
Payer: MEDICARE

## 2025-01-06 NOTE — PROGRESS NOTES
Alecia was seen on 1/6/25 for a scheduled hearing aid programming and dispense appointment.  Patient's devices were not at the clinic today, audiologist's error.  Patient will be fit with clinic trial devices until her aids are in the clinic    RECALL  Alecia was seen on 12/09/24 to return Clinic trial Audeo L90R aids  She is ready to purchase her own hearing aids after completing the trial     Alecia Baptiste was seen on 11/25/24 for a hearing aid consultation and device demonstration  Most recent audiogram was 10/31/24    Due to patient's hearing loss and lifestyle mid range BIA aids are recommended in silver color (Audeo I70 R, 1 M R and 1 M L receivers)  Size 1 receivers and small vented domes are recommended  Realistic expectations were discussed  Alecia was informed of the Hearing aid warranty period  30 day trial period was discussed  Patient paid for total charges in full on 12/9/24     Treatment Plan:  Return in 3 weeks for hearing aid programming and dispense  Annual audiograms

## 2025-01-17 ENCOUNTER — TELEPHONE (OUTPATIENT)
Dept: PRIMARY CARE | Facility: CLINIC | Age: 85
End: 2025-01-17
Payer: MEDICARE

## 2025-01-17 DIAGNOSIS — M25.551 BILATERAL HIP PAIN: Primary | ICD-10-CM

## 2025-01-17 DIAGNOSIS — M25.552 BILATERAL HIP PAIN: Primary | ICD-10-CM

## 2025-01-17 RX ORDER — METHOCARBAMOL 500 MG/1
500 TABLET, FILM COATED ORAL 2 TIMES DAILY PRN
Qty: 20 TABLET | Refills: 0 | Status: SHIPPED | OUTPATIENT
Start: 2025-01-17 | End: 2025-02-16

## 2025-01-23 ENCOUNTER — CLINICAL SUPPORT (OUTPATIENT)
Dept: AUDIOLOGY | Facility: CLINIC | Age: 85
End: 2025-01-23
Payer: MEDICARE

## 2025-01-23 DIAGNOSIS — H90.3 SENSORINEURAL HEARING LOSS (SNHL) OF BOTH EARS: ICD-10-CM

## 2025-01-23 NOTE — PROGRESS NOTES
Alecia Baptiste  YOB: 1940  Age: 84 y.o.    Date of Fittin2025      Alecia Baptiste , 84 y.o., was seen for a hearing aid fitting. Alecia Baptiste recently purchased the below hearing aids. Alecia Baptiste has a history of bilateral sensorineural hearing loss.         Audeo I70R hearing aids    Right serial # [id number]    Left serial # [id number]    Warranty expiration: 2028    Small vented domes    Programming was completed at 85% of Phonak prescriptive fitting strategy.   Patient reported 90% target gain to be too loud  Feedback manager was run today.  Program and volume control signals were demonstrated for the patient.   Care and maintenance of the device were discussed with the patient. The patient was able to properly insert and remove the hearing instrument from the ear. In addition to today's verbal instruction of the hearing devices, the patient was given written instructions from the hearing aid . Hearing aid limitations were discussed at length as well as realistic expectations. The patient was advised in order to receive full benefit of amplification, consistent use during all waking hours is recommended.  Counseled patient regarding adaptation period.    The repair warranty and the conditions of the right-to-return period (30-days) were discussed. The patient reports understanding of these conditions. Purchase agreement was signed (see scanned documents).    Hearing aid verification was completed with hearing aids at user settings using simulated REM. The hearing aids were verified to NAL-N2 targets using speech mapping for soft (55 dB) and average (65 dB) speech     Treatment Plan:  1.      Follow-up in 2-4 weeks for a hearing aid check and aided testing  2.      Annual hearing test  3.      Hearing aid checks as needed         Time: 8830-6210         Completed by:    Slick Solo, [facility]3]-A    Licensed Audiologist

## 2025-01-27 ENCOUNTER — TELEPHONE (OUTPATIENT)
Dept: PRIMARY CARE | Facility: CLINIC | Age: 85
End: 2025-01-27
Payer: MEDICARE

## 2025-01-27 NOTE — TELEPHONE ENCOUNTER
Pt was informed to take magnesium and cont with the muscle relaxer and motrin and let us know if it does not help

## 2025-01-30 DIAGNOSIS — M25.552 BILATERAL HIP PAIN: ICD-10-CM

## 2025-01-30 DIAGNOSIS — M25.551 BILATERAL HIP PAIN: ICD-10-CM

## 2025-01-30 RX ORDER — METHOCARBAMOL 500 MG/1
500 TABLET, FILM COATED ORAL 2 TIMES DAILY PRN
Qty: 20 TABLET | Refills: 0 | Status: SHIPPED | OUTPATIENT
Start: 2025-01-30 | End: 2025-03-01

## 2025-01-31 NOTE — PROGRESS NOTES
"  Patient ID: Alecia Baptiste is a 84 y.o. female who presents for No chief complaint on file..    Referring Provider: Hadley Driver/Marcus Wall  Pt was referred for cost assistance    Preferred Pharmacy:    [id number] - [facility] [address] AT [facility] [address]  [address] [facility] [address]  Phone: [phone] Fax: [phone]     Specialty Pharmacy  [address] [facility] [address]  Phone: [phone] Fax: [phone]      Copay assistance:   Do you have copays on your medications? Yes  Do you have trouble affording your current medications? Yes     Patient Assistance Screening (VAF)    Patient verbally reports monthly or yearly income which is less than 400% federal poverty level   Application for program has been submitted for the following medications:   Estradiol vaginal cream  Gemtesa  Patient has been informed that program team will be reaching out to them to discuss necessary documentation, instructed to answer phone/return voicemail.   Patient aware this process may take up to 6 weeks.   If approved medication must be filled through [facility] pharmacy and may be picked up or mailed to patient.       Subjective      Vaginal Symptoms  Vaginal Dryness: No  Recurrent urinary tract infections: No  No results found for: \"ESTRADIOLFRE\", \"PROGESTERONE\", \"ESTRADIOL\", \"FSH\"    Current Treatment:   Estradiol vaginal cream every other night    Urinary Symptoms  Medications that may contribute to symptoms:   diuretics, anticholinergics, alpha blockers (doxazosin, prazosin, terazosin), tricyclic antidepressants, antipsychotics (lithium, clozapine), calcium channel blockers (causes bladder to relax and affects ability to empty properly), opioids (impair bladder contraction), antihistamines (diphenhydramine, chlorpheniramine), pseudoephedrine, phenylephrine, SGLT2 inhibitors (increases the amount of glucose and fluid excreted through kidneys).   Any " "history of:   Narrow-angle glaucoma? No  Impaired gastric emptying? No  Urinary retention? No  If yes to any of the above use caution/avoid antimuscarinic medications    If diabetes, blood sugar controlled? N/a  Frequently of bowel movement? Daily  Tobacco use? No  How many protective undergarments are you utilizing daily? 2-3 daily  What medications have been tried/stopped?   None  Current medication? Gemtesa  When started? 2 mo   Side effects?  No  Improvement in symptoms? Some      Cardiovascular Health  The ASCVD Risk score (Allie ABEL, et al., 2019) failed to calculate for the following reasons:    The 2019 ASCVD risk score is only valid for ages 40 to 79    Lab Results   Component Value Date    CHOL 185 08/14/2024     Lab Results   Component Value Date    HDL 50.6 08/14/2024     Lab Results   Component Value Date    LDLCALC 120 (H) 08/14/2024     Lab Results   Component Value Date    TRIG 72 08/14/2024     No components found for: \"CHOLHDL\"        Blood Sugar Balance  Lab Results   Component Value Date    GLUCOSE 101 (H) 08/14/2024     No results found for: \"LEPTIN\", \"INSULFAST\", \"GLUF\"      Thyroid  Lab Results   Component Value Date    TSH 1.28 08/14/2024    FREET4 1.14 12/10/2018       Iron Status  No results found for: \"IRON\", \"TIBC\", \"FERRITIN\"     Kidney Function  Lab Results   Component Value Date    GFRF 90 05/24/2023    CREATININE 0.66 08/14/2024       Potassium  Lab Results   Component Value Date    K 4.3 08/14/2024        Vitamin D3  Lab Results   Component Value Date    VITD25 62 08/14/2024       Current Outpatient Medications on File Prior to Visit   Medication Sig Dispense Refill    amLODIPine (Norvasc) 5 mg tablet Take 1 tablet (5 mg) by mouth once daily. 90 tablet 1    busPIRone (Buspar) 10 mg tablet Take 1 tablet (10 mg) by mouth 3 times a day as needed (anxiety). 90 tablet 0    cholecalciferol (Vitamin D-3) 25 MCG (1000 UT) tablet Take 1 tablet (25 mcg) by mouth once every 24 hours.      " docusate sodium (Colace) 100 mg capsule Take 1 capsule (100 mg) by mouth 2 times a day as needed.      estradiol (Estrace) 0.01 % (0.1 mg/gram) vaginal cream Place a dime sized amount vaginally nightly for 2 weeks then 3 times a week thereafter. 42.5 g 3    fluticasone (Flonase) 50 mcg/actuation nasal spray 1 spray by Does not apply route.      methocarbamol (Robaxin) 500 mg tablet Take 1 tablet (500 mg) by mouth 2 times a day as needed for muscle spasms. 20 tablet 0    metoprolol succinate XL (Toprol-XL) 50 mg 24 hr tablet Take 1 tablet (50 mg) by mouth once daily. 90 tablet 0    traZODone (Desyrel) 50 mg tablet Take 1 tablet (50 mg) by mouth as needed at bedtime for sleep. 30 tablet 2    vibegron 75 mg tablet Take 1 tablet (75 mg) by mouth once daily. 90 tablet 3    [DISCONTINUED] methocarbamol (Robaxin) 500 mg tablet Take 1 tablet (500 mg) by mouth 2 times a day as needed for muscle spasms. 20 tablet 0     No current facility-administered medications on file prior to visit.        Medication and allergy reconciliation completed     Drug Interactions   No significant drug interactions identified    Assessment/Plan     Patient is experiencing urinary frequency, urgency, and incontinence.     GEMTESA  Has tried before: None  Patient has been waking up less at night, during the day notices less urge to run to the bathroom  Tolerating well  No side effects    ESTRADIOL  Using every other day  Tolerating well  No side effects      Patient plans to apply for [facility], pending 1040   Patient has lost her  in the past 8 months  Patient feels overwhelmed by recently losing her  and does not want to provide the paperwork required for the patient assistance program.   She is considering going off of Gemtesa x 1 month to see how she does without it, because she questions how well it is working for her.   Patient confirms their income is within the below guidelines  Patient confirms they live in [place]  Patient confirms they have current insurance that covers medications  2025 Poverty Guidelines     Persons in family/household 400% federal Poverty Limit    1 62,600    2 84,600    3 106,600    4 128,600    5 150,600    6 172,600    7 194,600    8 216,600        Gemtesa   Discussed MOA: works by activating beta-3 adrenergic receptors in the bladder resulting in relaxation of the detrusor smooth muscle during the urine storage phase, thus increasing bladder capacity.  Provided education on administration and potential side effects including but not limited to hot flashes <2%, constipation/diarrhea <2%, dry mouth <2%, and headache <4%.   Gemtesa (vibegron) starts working almost immediately - within a few days of first taking it, with noticeable improvements in urinary urgency, frequency, and incontinence noted in clinical trials at 2 weeks which were reported as significant by 12 weeks.      LABS  Lab Results   Component Value Date    GFRF 90 05/24/2023       CONTINUE  Gemtesa 75 mg once daily    Follow-up: prn     Time spent with pt: Total length of time 30 (minutes) of the encounter and more than 50% was spent counseling the patient.      Ligia Green, Pharm.D, F[facility], [facility]  Clinical Pharmacist  Pharmacy Services  [phone]    Continue all meds under the continuation of care with the referring provider and clinical pharmacy team.    Verbal consent to manage patient's drug therapy was obtained from the patient and/or an individual authorized to act on behalf of a patient. They were informed they may decline to participate or withdraw from participation in pharmacy services at any time.

## 2025-02-03 ENCOUNTER — APPOINTMENT (OUTPATIENT)
Dept: PHARMACY | Facility: HOSPITAL | Age: 85
End: 2025-02-03
Payer: MEDICARE

## 2025-02-03 DIAGNOSIS — N39.41 URGE INCONTINENCE: ICD-10-CM

## 2025-02-03 RX ORDER — MELATONIN 3 MG
1 CAPSULE ORAL AS NEEDED
COMMUNITY

## 2025-02-03 RX ORDER — MAGNESIUM 250 MG
1 TABLET ORAL DAILY
COMMUNITY

## 2025-02-04 DIAGNOSIS — F41.9 ANXIETY: ICD-10-CM

## 2025-02-04 RX ORDER — BUSPIRONE HYDROCHLORIDE 10 MG/1
10 TABLET ORAL 3 TIMES DAILY PRN
Qty: 90 TABLET | Refills: 0 | Status: SHIPPED | OUTPATIENT
Start: 2025-02-04

## 2025-02-05 ENCOUNTER — TELEPHONE (OUTPATIENT)
Dept: PRIMARY CARE | Facility: CLINIC | Age: 85
End: 2025-02-05
Payer: MEDICARE

## 2025-02-05 DIAGNOSIS — M79.7 FIBROMYALGIA: Primary | ICD-10-CM

## 2025-02-10 ENCOUNTER — APPOINTMENT (OUTPATIENT)
Dept: AUDIOLOGY | Facility: CLINIC | Age: 85
End: 2025-02-10
Payer: MEDICARE

## 2025-02-11 NOTE — PROGRESS NOTES
[facility]  New Patient Office Visit Note 2025    Patient Information: Alecia Baptiste, MRN: [id number], : 1940   Primary Care/Referring Physician: Dipti Mitchell MD, [address] [address] / [facility] [address]     Chief Complaint: Left buttock and right shoulder blade pain  History of Pain: Ms. Alecia Baptiste is a 84 y.o. female with a PMHx of HTN, GERD, fibromyalgia who presents for left buttock and right shoulder blade pain.    She reports onset of pain around 3-4 weeks ago. She has been doing pelvic floor PT and believes she may have injured herself while doing this. This pain bothers her the most at night as well as with prolonged sitting. This is a fairly focal pain overlying the left buttock. She has some tenderness to palpation overlying this area. She denies any pain radiating down her legs. She has chronic occasionally tingling around her knees, which has been present since her knee surgeries. She also with pain medial to her right scapula. She is leaving for [place] in 10 days and worried about her pain level and ability to go on this trip. She has been using Methocarbamol which is giving her some relief with no side effects    Current Pain Medications: OTC Tylenol and Ibuprofen, Methocarbamol 500 mg BID  Previously Tried Pain Medications:    Relevant Surgeries: Hx of bilateral TKA. Denies lumbar spine or hip surgery  Injections: Denies  Physical/Occupational Therapy: Denies PT for this specific pain. Currently doing pelvic floor PT    Medications:   Current Outpatient Medications   Medication Instructions    amLODIPine (NORVASC) 5 mg, oral, Daily    busPIRone (BUSPAR) 10 mg, oral, 3 times daily PRN    cholecalciferol (VITAMIN D-3) 25 mcg, Every 24 hours    cyanocobalamin, vitamin B-12, (VITAMIN B-12 ORAL) 1 tablet, Daily    docusate sodium (COLACE) 100 mg, 2 times daily PRN    estradiol (Estrace) 0.01 % (0.1 mg/gram) vaginal cream Place a dime sized amount vaginally nightly for 2  weeks then 3 times a week thereafter.    fluticasone (Flonase) 50 mcg/actuation nasal spray 1 spray    magnesium 250 mg tablet 1 tablet, Daily    melatonin 3 mg capsule 1 capsule, As needed    methocarbamol (ROBAXIN) 500 mg, oral, 2 times daily PRN    metoprolol succinate XL (TOPROL-XL) 50 mg, oral, Daily    traZODone (DESYREL) 50 mg, oral, Nightly PRN    vibegron 75 mg, oral, Daily      Allergies: No Known Allergies    Past Medical & Surgical History:  Past Medical History:   Diagnosis Date    Abnormal findings on diagnostic imaging of heart and coronary circulation 09/25/2014    Agatston coronary artery calcium score less than 100    Anxiety     Arthritis of right knee 05/02/2023    Difficulty walking 05/02/2023    Diverticulosis of intestine, part unspecified, without perforation or abscess without bleeding 10/25/2015    Diverticulosis    Encounter for screening for malignant neoplasm of colon 11/01/2016    Screening for colon cancer    Essential (primary) hypertension 11/24/2013    Benign essential hypertension    Foot injury 05/02/2023    Frequent urination 05/02/2023    Lactose intolerance, unspecified 10/23/2015    Lactose intolerance    Osteoarthritis of right knee 05/02/2023    Other conditions influencing health status 09/25/2014    Normal echocardiogram    Personal history of other diseases of the nervous system and sense organs 04/27/2016    History of labyrinthitis    Personal history of other diseases of the respiratory system 10/25/2015    History of asthma    Personal history of other endocrine, nutritional and metabolic disease 07/19/2018    History of dehydration    Personal history of other medical treatment 09/26/2016    History of screening mammography    Personal history of other medical treatment 09/25/2014    H/O bone density study    Personal history of other specified conditions 04/27/2016    History of vertigo    Right knee pain 05/02/2023    Stiffness of left knee, not elsewhere  classified 01/15/2020    Stiffness of left knee    Unspecified fall, initial encounter 2014    Fall, accidental    Uterine prolapse 2023    UTI (urinary tract infection) 2023    Weakness of both hips 2023      Past Surgical History:   Procedure Laterality Date    COLONOSCOPY  2014    Complete Colonoscopy    DILATION AND CURETTAGE OF UTERUS  2014    Dilation And Curettage    OTHER SURGICAL HISTORY  01/15/2020    Knee replacement    TOTAL KNEE ARTHROPLASTY Right        Family History   Problem Relation Name Age of Onset    Breast cancer Mother          Onset age 50s,  age 67    Coronary artery disease Father      Other (Diabetes mellitus) Father      Sudden death Son      No Known Problems Son       Social History     Socioeconomic History    Marital status:      Spouse name: Not on file    Number of children: Not on file    Years of education: Not on file    Highest education level: Not on file   Occupational History    Not on file   Tobacco Use    Smoking status: Never     Passive exposure: Never    Smokeless tobacco: Never   Vaping Use    Vaping status: Never Used   Substance and Sexual Activity    Alcohol use: Yes     Alcohol/week: 7.0 standard drinks of alcohol     Types: 7 Glasses of wine per week    Drug use: Never    Sexual activity: Defer   Other Topics Concern    Not on file   Social History Narrative    Not on file     Social Drivers of Health     Financial Resource Strain: Not on file   Food Insecurity: Not on file   Transportation Needs: Not on file   Physical Activity: Not on file   Stress: Not on file   Social Connections: Not on file   Intimate Partner Violence: Not At Risk (2023)    [facility]     Threatened: Not on file     Insulted: Not on file     Physically Hurt : Not on file     Scream: Not on file   Housing Stability: At Risk (2023)    [facility]      "Living Situation: Not on file     Housing Problems: Not on file       Problems, Past medical history, past surgical history, Medications, allergies, social and family history reviewed and as per the electronic medical record from today's encounter    Review of Systems:  CONST: No fever, chills, fatigue, weight changes  EYES: No loss of vision  ENT: No hearing loss, tinnitus  CV: No chest pain, palpitations  RESP: No dyspnea, shortness of breath, cough  GI: No stool incontinence, nausea, vomiting  : No urinary incontinence  MSK: No joint swelling  SKIN: No rash, no hives  NEURO: No headache, dizziness  PSYCH: Reports anxiety  HEM/LYMPH: No easy bruising or bleeding  All other systems reviewed are negative     Physical Exam:  Vitals: Pulse 76   Resp 18   Ht 1.676 m (5' 6\")   Wt 78.9 kg (174 lb)   SpO2 97%   BMI 28.08 kg/m²   General: No apparent distress. Alert, appropriate, oriented x 3. Mood and affect normal. Speaking in full sentences.  HENT: Normocephalic, atraumatic. Hearing intact.  Eyes: Extraocular movements grossly intact. Pupils equal and round.   Neck: Supple, trachea midline.  Lungs: Symmetric respiratory excursion on visual exam, nonlabored breathing.  Extremities: No clubbing, cyanosis, or edema noted in arms or legs.  Skin: No rashes, lesions, alopecia noted on back or extremities.   Back: Reports pain to palpation of left gluteus medius and gustavo muscles, right thoracic paraspinal muscles. No spasm noted over musculature. No misalignment or asymmetry noted.  Neuro: Alert and appropriate. Able to rise unassisted. Gait within normal limits. Bulk and tone within normal limits.    Laboratory Data:  The following laboratory data were reviewed during this visit:   Lab Results   Component Value Date    WBC 6.3 08/14/2024    RBC 3.93 (L) 08/14/2024    HGB 12.5 08/14/2024    HCT 39.8 08/14/2024     08/14/2024      Lab Results   Component Value Date    INR 1.0 07/10/2018     Lab Results "   Component Value Date    CREATININE 0.66 08/14/2024       Imaging:  The following imaging impressions were reviewed by me during this visit:    -No results found for this or any previous visit from the past 180 days.       I also personally reviewed the images from the above studies myself. These images and my interpretation of them contributed to the management and decision making of the patient's medical plan.    ASSESSMENT:  Ms. Alecia Baptiste is a 84 y.o. female with left buttock and right mid back pain that is consistent with:    1. Myofascial pain    2. Muscle strain of left gluteal region, initial encounter    3. Mid back pain on right side    4. Fibromyalgia        PLAN:    Diagnostics:   - No further diagnostics are indicated at this time.    Physical Therapy and Rehabilitation:     - OK to continue your HEP    Psychologically:  - No needs at this time    Medications  - No changes to medication today. Refills for Methocarbamol 500 mg BID PRN provided today    Duration  - 4 weeks    Interventions:  - I suspect her pain is primarily related to muscle strain and myofascial pain. This has not improved with 1 month of conservative treatment including NSAID's and activity modification. I recommend trigger point injections, which were performed today, as noted below    Inject Trigger Points, >3    Date/Time: 2/13/2025 2:30 PM    Performed by: James De La Rosa MD  Authorized by: James De La Rosa MD  Preparation: Patient was prepped and draped in the usual sterile fashion.  Local anesthesia used: no    Anesthesia:  Local anesthesia used: no    Sedation:  Patient sedated: no    Patient tolerance: patient tolerated the procedure well with no immediate complications  Comments: Procedure Note: Trigger Point Injections  The correct site and laterality were confirmed with the patient.  Next, I palpated and marked 3 trigger points in the left gluteus medius, left gluteus gustavo, right thoracic paraspinal muscles.  The  sites were then cleaned with alcohol and a 1.5 inch 25 gauge needle was used to infiltrate each site with 2 mL of a mixture of 20 mg triamcinolone diluted in 5 mL of bupivacaine 0.5%.  Dry needling was then performed at each site for 5-10 seconds.  The patient tolerated this procedure well and there were no immediate complications            Sincerely,  James De La Rosa MD  [facility] - [place]

## 2025-02-13 ENCOUNTER — OFFICE VISIT (OUTPATIENT)
Dept: PAIN MEDICINE | Facility: CLINIC | Age: 85
End: 2025-02-13
Payer: MEDICARE

## 2025-02-13 VITALS
OXYGEN SATURATION: 97 % | WEIGHT: 174 LBS | BODY MASS INDEX: 27.97 KG/M2 | HEIGHT: 66 IN | RESPIRATION RATE: 18 BRPM | HEART RATE: 76 BPM

## 2025-02-13 DIAGNOSIS — M79.7 FIBROMYALGIA: ICD-10-CM

## 2025-02-13 DIAGNOSIS — M54.9 MID BACK PAIN ON RIGHT SIDE: ICD-10-CM

## 2025-02-13 DIAGNOSIS — S76.012A MUSCLE STRAIN OF LEFT GLUTEAL REGION, INITIAL ENCOUNTER: ICD-10-CM

## 2025-02-13 DIAGNOSIS — M79.18 MYOFASCIAL PAIN: Primary | ICD-10-CM

## 2025-02-13 PROCEDURE — 1160F RVW MEDS BY RX/DR IN RCRD: CPT | Performed by: STUDENT IN AN ORGANIZED HEALTH CARE EDUCATION/TRAINING PROGRAM

## 2025-02-13 PROCEDURE — 99204 OFFICE O/P NEW MOD 45 MIN: CPT | Performed by: STUDENT IN AN ORGANIZED HEALTH CARE EDUCATION/TRAINING PROGRAM

## 2025-02-13 PROCEDURE — 1036F TOBACCO NON-USER: CPT | Performed by: STUDENT IN AN ORGANIZED HEALTH CARE EDUCATION/TRAINING PROGRAM

## 2025-02-13 PROCEDURE — 20553 NJX 1/MLT TRIGGER POINTS 3/>: CPT | Performed by: STUDENT IN AN ORGANIZED HEALTH CARE EDUCATION/TRAINING PROGRAM

## 2025-02-13 PROCEDURE — 1125F AMNT PAIN NOTED PAIN PRSNT: CPT | Performed by: STUDENT IN AN ORGANIZED HEALTH CARE EDUCATION/TRAINING PROGRAM

## 2025-02-13 PROCEDURE — 96372 THER/PROPH/DIAG INJ SC/IM: CPT | Performed by: STUDENT IN AN ORGANIZED HEALTH CARE EDUCATION/TRAINING PROGRAM

## 2025-02-13 PROCEDURE — G2211 COMPLEX E/M VISIT ADD ON: HCPCS | Performed by: STUDENT IN AN ORGANIZED HEALTH CARE EDUCATION/TRAINING PROGRAM

## 2025-02-13 PROCEDURE — 2500000004 HC RX 250 GENERAL PHARMACY W/ HCPCS (ALT 636 FOR OP/ED): Performed by: STUDENT IN AN ORGANIZED HEALTH CARE EDUCATION/TRAINING PROGRAM

## 2025-02-13 PROCEDURE — 1159F MED LIST DOCD IN RCRD: CPT | Performed by: STUDENT IN AN ORGANIZED HEALTH CARE EDUCATION/TRAINING PROGRAM

## 2025-02-13 PROCEDURE — 1123F ACP DISCUSS/DSCN MKR DOCD: CPT | Performed by: STUDENT IN AN ORGANIZED HEALTH CARE EDUCATION/TRAINING PROGRAM

## 2025-02-13 PROCEDURE — 99214 OFFICE O/P EST MOD 30 MIN: CPT | Mod: 25 | Performed by: STUDENT IN AN ORGANIZED HEALTH CARE EDUCATION/TRAINING PROGRAM

## 2025-02-13 RX ORDER — TRIAMCINOLONE ACETONIDE 40 MG/ML
20 INJECTION, SUSPENSION INTRA-ARTICULAR; INTRAMUSCULAR ONCE
Status: COMPLETED | OUTPATIENT
Start: 2025-02-13 | End: 2025-02-13

## 2025-02-13 RX ORDER — BUPIVACAINE HYDROCHLORIDE 5 MG/ML
5 INJECTION, SOLUTION EPIDURAL; INTRACAUDAL ONCE
Status: COMPLETED | OUTPATIENT
Start: 2025-02-13 | End: 2025-02-13

## 2025-02-13 RX ORDER — METHOCARBAMOL 500 MG/1
500 TABLET, FILM COATED ORAL 2 TIMES DAILY PRN
Qty: 120 TABLET | Refills: 0 | Status: SHIPPED | OUTPATIENT
Start: 2025-02-13 | End: 2025-04-14

## 2025-02-13 RX ADMIN — TRIAMCINOLONE ACETONIDE 20 MG: 40 INJECTION, SUSPENSION INTRA-ARTICULAR; INTRAMUSCULAR at 14:51

## 2025-02-13 RX ADMIN — BUPIVACAINE HYDROCHLORIDE 25 MG: 5 INJECTION, SOLUTION EPIDURAL; INTRACAUDAL; PERINEURAL at 14:51

## 2025-02-13 ASSESSMENT — PATIENT HEALTH QUESTIONNAIRE - PHQ9
10. IF YOU CHECKED OFF ANY PROBLEMS, HOW DIFFICULT HAVE THESE PROBLEMS MADE IT FOR YOU TO DO YOUR WORK, TAKE CARE OF THINGS AT HOME, OR GET ALONG WITH OTHER PEOPLE: SOMEWHAT DIFFICULT
1. LITTLE INTEREST OR PLEASURE IN DOING THINGS: SEVERAL DAYS
2. FEELING DOWN, DEPRESSED OR HOPELESS: SEVERAL DAYS
SUM OF ALL RESPONSES TO PHQ9 QUESTIONS 1 AND 2: 2

## 2025-02-13 ASSESSMENT — LIFESTYLE VARIABLES: TOTAL SCORE: 0

## 2025-02-13 ASSESSMENT — PAIN - FUNCTIONAL ASSESSMENT: PAIN_FUNCTIONAL_ASSESSMENT: 0-10

## 2025-02-13 ASSESSMENT — PAIN SCALES - GENERAL
PAINLEVEL_OUTOF10: 3
PAINLEVEL_OUTOF10: 3

## 2025-03-10 DIAGNOSIS — F51.01 PRIMARY INSOMNIA: ICD-10-CM

## 2025-03-10 RX ORDER — TRAZODONE HYDROCHLORIDE 50 MG/1
50 TABLET ORAL NIGHTLY PRN
Qty: 30 TABLET | Refills: 2 | Status: SHIPPED | OUTPATIENT
Start: 2025-03-10 | End: 2026-03-10

## 2025-03-24 DIAGNOSIS — I10 BENIGN ESSENTIAL HTN: ICD-10-CM

## 2025-03-24 RX ORDER — METOPROLOL SUCCINATE 50 MG/1
50 TABLET, EXTENDED RELEASE ORAL DAILY
Qty: 90 TABLET | Refills: 1 | Status: SHIPPED | OUTPATIENT
Start: 2025-03-24

## 2025-04-17 DIAGNOSIS — I10 HYPERTENSION, ISOLATED SYSTOLIC: ICD-10-CM

## 2025-04-17 RX ORDER — AMLODIPINE BESYLATE 5 MG/1
5 TABLET ORAL DAILY
Qty: 90 TABLET | Refills: 1 | Status: SHIPPED | OUTPATIENT
Start: 2025-04-17

## 2025-04-29 ENCOUNTER — APPOINTMENT (OUTPATIENT)
Dept: PRIMARY CARE | Facility: CLINIC | Age: 85
End: 2025-04-29
Payer: MEDICARE

## 2025-05-12 PROBLEM — Z00.00 MEDICARE ANNUAL WELLNESS VISIT, SUBSEQUENT: Status: ACTIVE | Noted: 2025-05-12

## 2025-05-12 PROBLEM — E78.5 HLD (HYPERLIPIDEMIA): Status: ACTIVE | Noted: 2025-05-12

## 2025-05-12 PROBLEM — E53.8 B12 DEFICIENCY: Status: ACTIVE | Noted: 2025-05-12

## 2025-05-12 NOTE — ASSESSMENT & PLAN NOTE
Lab Results   Component Value Date    CHOL 185 08/14/2024    TRIG 72 08/14/2024    HDL 50.6 08/14/2024    CHHDL 3.7 08/14/2024    LDLF 131 (H) 05/24/2023    VLDL 14 08/14/2024    NHDL 134 08/14/2024      - Due for repeat lipid panel, ordered today

## 2025-05-12 NOTE — ASSESSMENT & PLAN NOTE
- DEXA June 2024:  According to World Health Organization criteria, classification is osteoporosis left hip. Normal bone density lumbar spine. Followup recommended in two years or sooner as clinically warranted.  - Patient previously opted against Fosamax  - Check vitamin D, Ca with annual labs   - Continue cholecalciferol   - PT referral provided for strengthening per patient request   - Repeat DEXA in June 2026

## 2025-05-12 NOTE — PROGRESS NOTES
Patient ID:   Alecia Baptiste is a 84 y.o. female with PMH remarkable for osteoporosis, fibromyalgia, hearing loss, bladder prolapse, HTN, HLD, anxiety who presents to the office today for Medicare Annual Wellness Visit Subsequent.    HEALTH MAINTENANCE: FOLLOW UP   Last Office Visit: 11/22/24 with Dr. Maier   Mammogram (40-75): Negative in 2018, now out of age range   Pap smear (21-65, or hysterectomy q5yrs): Negative in 2009, now out of age range   Last Labs: August 2024, ordered today   Colonoscopy (45-75 or age 40 with 1st degree relative dx colon ca): Negative Cologuard in 2020, now out of age range  Lung cancer screening (50-78 y/o x 20 pk yr for at least 20 yrs + current smoker OR quit in last 15 years, no CT w/I last year): N/A  DEXA (65+, q 2 years): 6/27/24: left hip osteoporosis - due for repeat in June 2026     HPI:    Alecia reports that she is doing okay. She has been struggling with worsening anxiety since the death of her  last year. The anniversary of his death is coming up next week and she is having more anxiety than usual. She is taking Buspirone as needed several times a week, usually at bedtime. She is also taking trazodone at bedtime most nights. She reports that she has had issues with insomnia in the past but it has gotten significantly worse due to anxiety and racing thoughts. She denies any panic attacks. She has a lot of family support and tries to stay active. She is trying to get out and garden more now that the weather is improving. She is seeing a grief counselor and feels this is helping.     She says that her fibromyalgia has been flaring up with the change of seasons but overall her pain is well controlled with Tylenol. She is not currently on Gemtasa for OAB due to cost and will be seeing urology next month to discuss further options.    BP today is elevated, 180/90. Patient denies any headache, vision changes, dizziness, lightheadedness, or chest pain. She does not regularly  "monitor her BP at home but she does have a machine.     Social History[1]  Review of Systems   Constitutional:  Negative for activity change, appetite change, chills and fever.   HENT:  Negative for congestion.    Eyes:  Negative for visual disturbance.   Respiratory:  Negative for cough and shortness of breath.    Cardiovascular:  Negative for chest pain and leg swelling.   Gastrointestinal:  Negative for abdominal pain, constipation, diarrhea, nausea and vomiting.   Genitourinary:  Positive for frequency. Negative for difficulty urinating and dysuria.   Musculoskeletal:  Positive for joint swelling (left leg, chronic 2/2 knee surgery).   Skin:  Negative for rash.   Neurological:  Negative for dizziness, light-headedness and headaches.   Psychiatric/Behavioral:  Negative for sleep disturbance and suicidal ideas. The patient is nervous/anxious.    All other systems reviewed and are negative.    Visit Vitals  /90 (BP Location: Left arm, Patient Position: Sitting)   Pulse 75   Resp 18   Ht 1.676 m (5' 6\")   Wt 74.4 kg (164 lb)   SpO2 94%   BMI 26.47 kg/m²   OB Status Postmenopausal   Smoking Status Never   BSA 1.86 m²     Physical Exam  Vitals and nursing note reviewed.   Constitutional:       General: She is not in acute distress.     Appearance: She is not toxic-appearing.   HENT:      Head: Normocephalic and atraumatic.      Mouth/Throat:      Mouth: Mucous membranes are moist.   Eyes:      General: No scleral icterus.     Conjunctiva/sclera: Conjunctivae normal.   Cardiovascular:      Rate and Rhythm: Normal rate and regular rhythm.   Pulmonary:      Effort: Pulmonary effort is normal. No respiratory distress.      Breath sounds: Normal breath sounds.   Abdominal:      General: There is no distension.      Palpations: Abdomen is soft.      Tenderness: There is no abdominal tenderness.   Musculoskeletal:      Cervical back: Normal range of motion.      Right lower leg: No edema.      Left lower leg: Edema " (chronic per patient) present.   Skin:     General: Skin is warm and dry.      Findings: No rash.   Neurological:      Mental Status: She is alert and oriented to person, place, and time.   Psychiatric:         Mood and Affect: Mood normal.         Behavior: Behavior normal.       Current Outpatient Medications   Medication Instructions    amLODIPine (NORVASC) 5 mg, oral, Daily    busPIRone (BUSPAR) 10 mg, oral, 3 times daily PRN    cholecalciferol (VITAMIN D-3) 25 mcg, Every 24 hours    cyanocobalamin, vitamin B-12, (VITAMIN B-12 ORAL) 1 tablet, Daily    docusate sodium (COLACE) 100 mg, 2 times daily PRN    estradiol (Estrace) 0.01 % (0.1 mg/gram) vaginal cream Place a dime sized amount vaginally nightly for 2 weeks then 3 times a week thereafter.    fluticasone (Flonase) 50 mcg/actuation nasal spray 1 spray    magnesium 250 mg tablet 1 tablet, Daily    methocarbamol (ROBAXIN) 500 mg, oral, 2 times daily PRN    metoprolol succinate XL (TOPROL-XL) 50 mg, oral, Daily    traZODone (DESYREL) 50 mg, oral, Nightly PRN    vibegron 75 mg, oral, Daily      Lab Results   Component Value Date    WBC 6.3 08/14/2024    HGB 12.5 08/14/2024    HCT 39.8 08/14/2024     08/14/2024    CHOL 185 08/14/2024    TRIG 72 08/14/2024    HDL 50.6 08/14/2024    ALT 13 08/14/2024    AST 17 08/14/2024     08/14/2024    K 4.3 08/14/2024     08/14/2024    CREATININE 0.66 08/14/2024    BUN 16 08/14/2024    CO2 31 08/14/2024    TSH 1.28 08/14/2024    INR 1.0 07/10/2018       Problem List Items Addressed This Visit           ICD-10-CM       Cardiac and Vasculature    Essential (primary) hypertension I10    - BP in office today 180/90, patient denies headache, chest pain, dizziness, vision changes  - Discussed home BP monitoring with patient; she will keep a log of her BP daily over the next week and contact the office with results  - Continue amlodipine and metoprolol succinate as prescribed for now   - Regular exercise and blood  pressure monitoring is encouraged         HLD (hyperlipidemia) E78.5    Lab Results   Component Value Date    CHOL 185 2024    TRIG 72 2024    HDL 50.6 2024    CHHDL 3.7 2024    LDLF 131 (H) 2023    VLDL 14 2024    NHDL 134 2024      - Due for repeat lipid panel, ordered today             ENT    Seasonal rhinitis J30.2    - Continue Flonase as needed             Endocrine/Metabolic    Other osteoporosis without current pathological fracture M81.8    - DEXA 2024:  According to World Health Organization criteria, classification is osteoporosis left hip. Normal bone density lumbar spine. Followup recommended in two years or sooner as clinically warranted.  - Patient previously opted against Fosamax  - Check vitamin D, Ca with annual labs   - Continue cholecalciferol   - PT referral provided for strengthening per patient request   - Repeat DEXA in 2026            Relevant Orders    Referral to Physical Therapy    Vitamin D deficiency E55.9    - Vitamin D level was 62 in 2024  - Repeat vitamin D level ordered   - Continue cholecalciferol          B12 deficiency E53.8    - B12 was 188 in 2024  - Continue cyanocobalamin   - Check B12 level with annual labs             Genitourinary and Reproductive    Female bladder prolapse N81.10    - Not currently taking Gemtasa due to cost  - Has follow up with urology next month to discuss alternatives             Health Encounters    Health care maintenance Z00.00    Relevant Orders    Vitamin D 25-Hydroxy,Total (for eval of Vitamin D levels)    Lipid Panel    TSH with reflex to Free T4 if abnormal    Comprehensive Metabolic Panel    CBC and Auto Differential    Hemoglobin A1c    Medicare annual wellness visit, subsequent - Primary Z00.00    - Annual labs ordered today             Mental Health    Anxiety F41.9    - Worsening anxiety since spouse  one year ago; anniversary of his death is this weekend, patient now  lives alone but has extensive family support  - Continue buspirone, trazodone  - Patient declines starting additional medication for anxiety at this time, feels she is managing OK with her current regimen  - Continue grief counseling  - Patient encouraged to continue her favorite activities such as gardening and seeing family            Musculoskeletal and Injuries    Fibromyalgia M79.7    - Reports that pain is controlled with Tylenol  - Continue regular activity as tolerated   - Refer to PT           Other Visit Diagnoses         Codes      Osteoporosis, unspecified osteoporosis type, unspecified pathological fracture presence     M81.0    Relevant Orders    Vitamin D 25-Hydroxy,Total (for eval of Vitamin D levels)      Follow-up encounter involving medication     Z79.899    Relevant Orders    CBC and Auto Differential          Return to clinic in 3 months or sooner if needed.   --------------------         [1]   Social History  Tobacco Use    Smoking status: Never     Passive exposure: Never    Smokeless tobacco: Never   Vaping Use    Vaping status: Never Used   Substance Use Topics    Alcohol use: Yes     Alcohol/week: 7.0 standard drinks of alcohol     Types: 7 Glasses of wine per week    Drug use: Never

## 2025-05-12 NOTE — ASSESSMENT & PLAN NOTE
- BP in office today 180/90, patient denies headache, chest pain, dizziness, vision changes  - Discussed home BP monitoring with patient; she will keep a log of her BP daily over the next week and contact the office with results  - Continue amlodipine and metoprolol succinate as prescribed for now   - Regular exercise and blood pressure monitoring is encouraged

## 2025-05-13 ENCOUNTER — APPOINTMENT (OUTPATIENT)
Dept: PRIMARY CARE | Facility: CLINIC | Age: 85
End: 2025-05-13
Payer: MEDICARE

## 2025-05-13 ENCOUNTER — OFFICE VISIT (OUTPATIENT)
Dept: PRIMARY CARE | Facility: CLINIC | Age: 85
End: 2025-05-13
Payer: MEDICARE

## 2025-05-13 VITALS
DIASTOLIC BLOOD PRESSURE: 90 MMHG | WEIGHT: 164 LBS | BODY MASS INDEX: 26.36 KG/M2 | HEIGHT: 66 IN | SYSTOLIC BLOOD PRESSURE: 180 MMHG | RESPIRATION RATE: 18 BRPM | OXYGEN SATURATION: 94 % | HEART RATE: 75 BPM

## 2025-05-13 DIAGNOSIS — F41.9 ANXIETY: ICD-10-CM

## 2025-05-13 DIAGNOSIS — Z00.00 HEALTH CARE MAINTENANCE: ICD-10-CM

## 2025-05-13 DIAGNOSIS — Z00.00 MEDICARE ANNUAL WELLNESS VISIT, SUBSEQUENT: Primary | ICD-10-CM

## 2025-05-13 DIAGNOSIS — I10 ESSENTIAL (PRIMARY) HYPERTENSION: ICD-10-CM

## 2025-05-13 DIAGNOSIS — E78.5 HYPERLIPIDEMIA, UNSPECIFIED HYPERLIPIDEMIA TYPE: ICD-10-CM

## 2025-05-13 DIAGNOSIS — E55.9 VITAMIN D DEFICIENCY: ICD-10-CM

## 2025-05-13 DIAGNOSIS — E53.8 B12 DEFICIENCY: Primary | ICD-10-CM

## 2025-05-13 DIAGNOSIS — N81.10 FEMALE BLADDER PROLAPSE: ICD-10-CM

## 2025-05-13 DIAGNOSIS — M81.8 OTHER OSTEOPOROSIS WITHOUT CURRENT PATHOLOGICAL FRACTURE: ICD-10-CM

## 2025-05-13 DIAGNOSIS — Z79.899 FOLLOW-UP ENCOUNTER INVOLVING MEDICATION: ICD-10-CM

## 2025-05-13 DIAGNOSIS — M81.0 OSTEOPOROSIS, UNSPECIFIED OSTEOPOROSIS TYPE, UNSPECIFIED PATHOLOGICAL FRACTURE PRESENCE: ICD-10-CM

## 2025-05-13 DIAGNOSIS — E53.8 B12 DEFICIENCY: ICD-10-CM

## 2025-05-13 DIAGNOSIS — J30.2 SEASONAL ALLERGIC RHINITIS, UNSPECIFIED TRIGGER: ICD-10-CM

## 2025-05-13 DIAGNOSIS — M79.7 FIBROMYALGIA: ICD-10-CM

## 2025-05-13 PROCEDURE — 99214 OFFICE O/P EST MOD 30 MIN: CPT

## 2025-05-13 PROCEDURE — 1036F TOBACCO NON-USER: CPT

## 2025-05-13 PROCEDURE — 1170F FXNL STATUS ASSESSED: CPT

## 2025-05-13 PROCEDURE — 3077F SYST BP >= 140 MM HG: CPT

## 2025-05-13 PROCEDURE — 1159F MED LIST DOCD IN RCRD: CPT

## 2025-05-13 PROCEDURE — 1160F RVW MEDS BY RX/DR IN RCRD: CPT

## 2025-05-13 PROCEDURE — 1125F AMNT PAIN NOTED PAIN PRSNT: CPT

## 2025-05-13 PROCEDURE — 3080F DIAST BP >= 90 MM HG: CPT

## 2025-05-13 ASSESSMENT — ENCOUNTER SYMPTOMS
ACTIVITY CHANGE: 0
CHILLS: 0
DIFFICULTY URINATING: 0
VOMITING: 0
SLEEP DISTURBANCE: 0
DIZZINESS: 0
ABDOMINAL PAIN: 0
COUGH: 0
NAUSEA: 0
FREQUENCY: 1
SHORTNESS OF BREATH: 0
FEVER: 0
DIARRHEA: 0
CONSTIPATION: 0
DYSURIA: 0
APPETITE CHANGE: 0
LIGHT-HEADEDNESS: 0
HEADACHES: 0
JOINT SWELLING: 1
NERVOUS/ANXIOUS: 1

## 2025-05-13 ASSESSMENT — ACTIVITIES OF DAILY LIVING (ADL)
BATHING: INDEPENDENT
TAKING_MEDICATION: INDEPENDENT
GROCERY_SHOPPING: INDEPENDENT
MANAGING_FINANCES: INDEPENDENT
DOING_HOUSEWORK: INDEPENDENT
DRESSING: INDEPENDENT

## 2025-05-13 ASSESSMENT — PATIENT HEALTH QUESTIONNAIRE - PHQ9
2. FEELING DOWN, DEPRESSED OR HOPELESS: SEVERAL DAYS
SUM OF ALL RESPONSES TO PHQ9 QUESTIONS 1 AND 2: 1
1. LITTLE INTEREST OR PLEASURE IN DOING THINGS: NOT AT ALL
10. IF YOU CHECKED OFF ANY PROBLEMS, HOW DIFFICULT HAVE THESE PROBLEMS MADE IT FOR YOU TO DO YOUR WORK, TAKE CARE OF THINGS AT HOME, OR GET ALONG WITH OTHER PEOPLE: SOMEWHAT DIFFICULT

## 2025-05-13 ASSESSMENT — PAIN SCALES - GENERAL: PAINLEVEL_OUTOF10: 7

## 2025-05-13 NOTE — ASSESSMENT & PLAN NOTE
- Vitamin D level was 62 in August 2024  - Repeat vitamin D level ordered   - Continue cholecalciferol

## 2025-05-13 NOTE — ASSESSMENT & PLAN NOTE
- Worsening anxiety since spouse  one year ago; anniversary of his death is this weekend, patient now lives alone but has extensive family support  - Continue buspirone, trazodone  - Patient declines starting additional medication for anxiety at this time, feels she is managing OK with her current regimen  - Continue grief counseling  - Patient encouraged to continue her favorite activities such as gardening and seeing family

## 2025-05-13 NOTE — ASSESSMENT & PLAN NOTE
- Not currently taking Gemtasa due to cost  - Has follow up with urology next month to discuss alternatives

## 2025-05-13 NOTE — PATIENT INSTRUCTIONS
It was nice to see you in the office today!  As discussed during our visit...    You are due for annual labs. These have been ordered for you.     Continue current medications for anxiety.     Check your blood pressure daily and keep a log. Contact the office for BP > 180. If needed we will increase your home medications.     Follow up with urology as scheduled to discuss Gemtasa.     Follow up with PT for strengthening/endurance exercises.       Please have your 12 hour FASTING blood drawn in the next couple weeks.    You do NOT need an appointment for lab work and/or Xray's but now that [facility] was bought out by [employer], an appointment for lab work is RECOMMENDED / encouraged.    Do not get lab work done while you are on steroids (prednisone, medrol dose pack, dexamethasone) unless instructed differently by Dr Maier because this can cause some labs to be off.  Hold Biotin, B vitamins, B Complex for 2-3 days before any blood draw (this can cause false thyroid function tests)  During the 12 hour FASTING time, you may have BLACK coffee, BLACK tea and/or water at any time.    The two nearest Outpatient Lab's to THIS office is:  [facility] (this is IN the Urgent Care building by [employer])  [address]  [place] [address] [phone]    Link to Schedule an Appointment:  [email or web address]    Hours:   Monday through Friday 7:30am - 4:30pm  CLOSED Saturday and Sunday    Or you can go to ...  [facility]  [address]  [place], [place] [address] [phone]    Link to Schedule an Appointment:  [email or web address]    Hours:   Monday through Friday 7am - 12:30pm, 1pm - 4:30pm  CLOSED Saturday and Sunday     Click the blue link to take you to the website to confirm hours/location  Location Search for Labwork  [email or web address]    We will contact you with the results of your blood work (once they have ALL finalized & Dr Maier has reviewed them) and let you know of any necessary adjustments need to be done to your plan of care.    If you do not hear from us within 3-5 days business days of having your blood drawn, please call the [place] office at [phone].   -----------  The  [employer] offers affordable laboratory tests.   Financial assistance is also available to those who meet financial eligibility requirements by submitting an [facility] Application or calling, [phone].   Click this link to Get a Price Estimate Today on what lab work may cost you.

## 2025-05-13 NOTE — ASSESSMENT & PLAN NOTE
- Reports that pain is controlled with Tylenol  - Continue regular activity as tolerated   - Refer to PT

## 2025-05-19 ENCOUNTER — APPOINTMENT (OUTPATIENT)
Dept: UROLOGY | Facility: CLINIC | Age: 85
End: 2025-05-19
Payer: MEDICARE

## 2025-05-20 NOTE — PATIENT INSTRUCTIONS
Overall you are doing well today  You have no evidence of active heart disease  Cancer related checkup will be complete with mammogram  Immunizations updated with pneumonia shot Prevnar 20; recommend COVID-19 booster  Consider tetanus diphtheria pertussis vaccine  Physical therapy as requested  Weight stable despite swelling; suggest compression hose, elevation and leg activity, low-sodium diet  Controlled substance patient agreement for tramadol and urine tox screen   Additional Notes: VV on thumb resolved Render Risk Assessment In Note?: no Detail Level: Simple

## 2025-05-28 LAB — VIT B12 SERPL-MCNC: 635 PG/ML (ref 200–1100)

## 2025-05-29 LAB
25(OH)D3+25(OH)D2 SERPL-MCNC: 71 NG/ML (ref 30–100)
ALBUMIN SERPL-MCNC: 4.2 G/DL (ref 3.6–5.1)
ALP SERPL-CCNC: 60 U/L (ref 37–153)
ALT SERPL-CCNC: 13 U/L (ref 6–29)
ANION GAP SERPL CALCULATED.4IONS-SCNC: 7 MMOL/L (CALC) (ref 7–17)
AST SERPL-CCNC: 16 U/L (ref 10–35)
BASOPHILS # BLD AUTO: 63 CELLS/UL (ref 0–200)
BASOPHILS NFR BLD AUTO: 0.8 %
BILIRUB SERPL-MCNC: 0.5 MG/DL (ref 0.2–1.2)
BUN SERPL-MCNC: 23 MG/DL (ref 7–25)
CALCIUM SERPL-MCNC: 9.3 MG/DL (ref 8.6–10.4)
CHLORIDE SERPL-SCNC: 103 MMOL/L (ref 98–110)
CHOLEST SERPL-MCNC: 212 MG/DL
CHOLEST/HDLC SERPL: 3.7 (CALC)
CO2 SERPL-SCNC: 29 MMOL/L (ref 20–32)
CREAT SERPL-MCNC: 0.6 MG/DL (ref 0.6–0.95)
EGFRCR SERPLBLD CKD-EPI 2021: 87 ML/MIN/1.73M2
EOSINOPHIL # BLD AUTO: 142 CELLS/UL (ref 15–500)
EOSINOPHIL NFR BLD AUTO: 1.8 %
ERYTHROCYTE [DISTWIDTH] IN BLOOD BY AUTOMATED COUNT: 12.2 % (ref 11–15)
EST. AVERAGE GLUCOSE BLD GHB EST-MCNC: 126 MG/DL
EST. AVERAGE GLUCOSE BLD GHB EST-SCNC: 7 MMOL/L
GLUCOSE SERPL-MCNC: 95 MG/DL (ref 65–99)
HBA1C MFR BLD: 6 %
HCT VFR BLD AUTO: 39.4 % (ref 35–45)
HDLC SERPL-MCNC: 57 MG/DL
HGB BLD-MCNC: 12.7 G/DL (ref 11.7–15.5)
LDLC SERPL CALC-MCNC: 132 MG/DL (CALC)
LYMPHOCYTES # BLD AUTO: 1928 CELLS/UL (ref 850–3900)
LYMPHOCYTES NFR BLD AUTO: 24.4 %
MCH RBC QN AUTO: 32.6 PG (ref 27–33)
MCHC RBC AUTO-ENTMCNC: 32.2 G/DL (ref 32–36)
MCV RBC AUTO: 101.3 FL (ref 80–100)
MONOCYTES # BLD AUTO: 687 CELLS/UL (ref 200–950)
MONOCYTES NFR BLD AUTO: 8.7 %
NEUTROPHILS # BLD AUTO: 5080 CELLS/UL (ref 1500–7800)
NEUTROPHILS NFR BLD AUTO: 64.3 %
NONHDLC SERPL-MCNC: 155 MG/DL (CALC)
PLATELET # BLD AUTO: 311 THOUSAND/UL (ref 140–400)
PMV BLD REES-ECKER: 9.9 FL (ref 7.5–12.5)
POTASSIUM SERPL-SCNC: 4.4 MMOL/L (ref 3.5–5.3)
PROT SERPL-MCNC: 6.5 G/DL (ref 6.1–8.1)
RBC # BLD AUTO: 3.89 MILLION/UL (ref 3.8–5.1)
SODIUM SERPL-SCNC: 139 MMOL/L (ref 135–146)
TRIGL SERPL-MCNC: 115 MG/DL
TSH SERPL-ACNC: 1.03 MIU/L (ref 0.4–4.5)
WBC # BLD AUTO: 7.9 THOUSAND/UL (ref 3.8–10.8)

## 2025-06-11 DIAGNOSIS — F41.9 ANXIETY: ICD-10-CM

## 2025-06-11 RX ORDER — BUSPIRONE HYDROCHLORIDE 10 MG/1
10 TABLET ORAL 3 TIMES DAILY PRN
Qty: 90 TABLET | Refills: 0 | Status: SHIPPED | OUTPATIENT
Start: 2025-06-11 | End: 2025-06-13 | Stop reason: SDUPTHER

## 2025-06-13 ENCOUNTER — TELEPHONE (OUTPATIENT)
Dept: PRIMARY CARE | Facility: CLINIC | Age: 85
End: 2025-06-13
Payer: MEDICARE

## 2025-06-13 DIAGNOSIS — G47.00 INSOMNIA, UNSPECIFIED TYPE: ICD-10-CM

## 2025-06-13 DIAGNOSIS — F41.9 ANXIETY: ICD-10-CM

## 2025-06-13 DIAGNOSIS — M79.7 FIBROMYALGIA: Primary | ICD-10-CM

## 2025-06-13 RX ORDER — TRAZODONE HYDROCHLORIDE 50 MG/1
75 TABLET ORAL NIGHTLY PRN
Qty: 45 TABLET | Refills: 1 | Status: SHIPPED | OUTPATIENT
Start: 2025-06-13 | End: 2025-08-12

## 2025-06-13 RX ORDER — DULOXETIN HYDROCHLORIDE 20 MG/1
20 CAPSULE, DELAYED RELEASE ORAL 2 TIMES DAILY
Qty: 60 CAPSULE | Refills: 1 | Status: SHIPPED | OUTPATIENT
Start: 2025-06-13

## 2025-06-13 RX ORDER — BUSPIRONE HYDROCHLORIDE 10 MG/1
10 TABLET ORAL 3 TIMES DAILY PRN
Qty: 90 TABLET | Refills: 0 | Status: SHIPPED | OUTPATIENT
Start: 2025-06-13

## 2025-06-13 NOTE — TELEPHONE ENCOUNTER
Pt was recently seen for AWV states her fibromyalgia is botheringher -is there a medication she can take to help?  Also on trazodone 50mg at bedtime- isnt helping anymore and then when she's up all night causes anxiety. Can the medication be increased?

## 2025-06-25 ENCOUNTER — TELEPHONE (OUTPATIENT)
Dept: PRIMARY CARE | Facility: CLINIC | Age: 85
End: 2025-06-25
Payer: MEDICARE

## 2025-06-30 ENCOUNTER — APPOINTMENT (OUTPATIENT)
Dept: UROLOGY | Facility: CLINIC | Age: 85
End: 2025-06-30
Payer: MEDICARE

## 2025-07-03 ENCOUNTER — CLINICAL SUPPORT (OUTPATIENT)
Dept: AUDIOLOGY | Facility: CLINIC | Age: 85
End: 2025-07-03

## 2025-07-03 DIAGNOSIS — H90.3 SENSORINEURAL HEARING LOSS (SNHL) OF BOTH EARS: ICD-10-CM

## 2025-07-03 PROCEDURE — V5299 HEARING SERVICE: HCPCS | Mod: AUDSP | Performed by: SOCIAL WORKER

## 2025-07-03 NOTE — PROGRESS NOTES
History:  Alecia was seen on 7/3/25 for a follow up hearing aid check.  They report doing ok with her hearing aids, but  Most recent audiogram 10/31/2024    Hearing aid(s): [id number]  Dispense date: 1/23/2025  Warranty date: 2/08/2020    Results:  Otoscopic exam revealed clear canals bilaterally  Listening check of hearing aid(s) revealed good function  Domes were cleaned/changed (small vented)  Wax filters were changed  Datalogging reveals 13.5 average hours of use.  Programming was performed in [employer] system: increase to 90% target gain    Patient wanted to connect the hearing aids to her iPhone. This was completed in her Bluetooth menu today  Patient requested the CARDFREE Erlin. Attempted to help her install but she did not know her password to her phone and it would not allow her to install erlin without that info  Provided her written instructions for CARDFREE Erlin    Summary:  The hearing aid(s) were in good working function at the end of today's visit  Patient was satisfied with sound quality    Treatment Plan:  Consistent use of hearing aids  Return for annual re-evaluations. Patient will be due in October 2025

## 2025-07-08 ENCOUNTER — OFFICE VISIT (OUTPATIENT)
Dept: PAIN MEDICINE | Facility: CLINIC | Age: 85
End: 2025-07-08
Payer: MEDICARE

## 2025-07-08 VITALS
SYSTOLIC BLOOD PRESSURE: 138 MMHG | WEIGHT: 164 LBS | DIASTOLIC BLOOD PRESSURE: 82 MMHG | OXYGEN SATURATION: 97 % | HEIGHT: 66 IN | BODY MASS INDEX: 26.36 KG/M2 | HEART RATE: 72 BPM | RESPIRATION RATE: 18 BRPM

## 2025-07-08 DIAGNOSIS — M79.7 FIBROMYALGIA: Primary | ICD-10-CM

## 2025-07-08 PROCEDURE — 99214 OFFICE O/P EST MOD 30 MIN: CPT | Performed by: STUDENT IN AN ORGANIZED HEALTH CARE EDUCATION/TRAINING PROGRAM

## 2025-07-08 PROCEDURE — 3079F DIAST BP 80-89 MM HG: CPT | Performed by: STUDENT IN AN ORGANIZED HEALTH CARE EDUCATION/TRAINING PROGRAM

## 2025-07-08 PROCEDURE — 1160F RVW MEDS BY RX/DR IN RCRD: CPT | Performed by: STUDENT IN AN ORGANIZED HEALTH CARE EDUCATION/TRAINING PROGRAM

## 2025-07-08 PROCEDURE — 1125F AMNT PAIN NOTED PAIN PRSNT: CPT | Performed by: STUDENT IN AN ORGANIZED HEALTH CARE EDUCATION/TRAINING PROGRAM

## 2025-07-08 PROCEDURE — G2211 COMPLEX E/M VISIT ADD ON: HCPCS | Performed by: STUDENT IN AN ORGANIZED HEALTH CARE EDUCATION/TRAINING PROGRAM

## 2025-07-08 PROCEDURE — 1159F MED LIST DOCD IN RCRD: CPT | Performed by: STUDENT IN AN ORGANIZED HEALTH CARE EDUCATION/TRAINING PROGRAM

## 2025-07-08 PROCEDURE — 3075F SYST BP GE 130 - 139MM HG: CPT | Performed by: STUDENT IN AN ORGANIZED HEALTH CARE EDUCATION/TRAINING PROGRAM

## 2025-07-08 RX ORDER — GABAPENTIN 100 MG/1
100 CAPSULE ORAL 3 TIMES DAILY
Qty: 90 CAPSULE | Refills: 5 | Status: SHIPPED | OUTPATIENT
Start: 2025-07-08 | End: 2026-01-04

## 2025-07-08 ASSESSMENT — PATIENT HEALTH QUESTIONNAIRE - PHQ9
2. FEELING DOWN, DEPRESSED OR HOPELESS: SEVERAL DAYS
1. LITTLE INTEREST OR PLEASURE IN DOING THINGS: NOT AT ALL
SUM OF ALL RESPONSES TO PHQ9 QUESTIONS 1 AND 2: 1

## 2025-07-08 ASSESSMENT — PAIN SCALES - GENERAL
PAINLEVEL_OUTOF10: 3
PAINLEVEL_OUTOF10: 3

## 2025-07-08 ASSESSMENT — PAIN DESCRIPTION - DESCRIPTORS: DESCRIPTORS: TIGHTNESS;SPASM

## 2025-07-08 ASSESSMENT — PAIN - FUNCTIONAL ASSESSMENT: PAIN_FUNCTIONAL_ASSESSMENT: 0-10

## 2025-07-08 NOTE — PROGRESS NOTES
"[facility]  Follow Up Office Visit Note 2025    Patient Information: Alecia Baptiste, MRN: [id number], : 1940   Primary Care/Referring Physician: Dipti Mitchell MD, [address] [address] / [facility] [address]     Chief Complaint: Right shoulder blade pain    Interval History: Ms. Baptiste presents today for follow up after TPI's    Today she reports doing better since last seen. Her gluteal pain resolved after the TPI's but her scapular pain did not improve significantly. She does report issues with decreased stamina and strength and is wondering what can be done to improve this. She also tried Duloxetine in the interim but it caused her to feel lightheaded and \"foggy\".      Brief History of Pain: Ms. Alecia Baptiste is a 84 y.o. female with a PMHx of HTN, GERD, fibromyalgia who presents for left buttock and right shoulder blade pain.    She reports onset of pain around 3-4 weeks ago. She has been doing pelvic floor PT and believes she may have injured herself while doing this. This pain bothers her the most at night as well as with prolonged sitting. This is a fairly focal pain overlying the left buttock. She has some tenderness to palpation overlying this area. She denies any pain radiating down her legs. She has chronic occasionally tingling around her knees, which has been present since her knee surgeries. She also with pain medial to her right scapula. She is leaving for [place] in 10 days and worried about her pain level and ability to go on this trip. She has been using Methocarbamol which is giving her some relief with no side effects    Current Pain Medications: OTC Tylenol and Ibuprofen, Methocarbamol 500 mg BID  Previously Tried Pain Medications: Duloxetine - caused lightheadedness and fogginess    Relevant Surgeries: Hx of bilateral TKA. Denies lumbar spine or hip surgery  Injections: TPI's - 100% improvement in left gluteal pain, no improvement in shoulder blade " pain  Physical/Occupational Therapy: Denies PT for this specific pain    Medications:   Current Outpatient Medications   Medication Instructions    amLODIPine (NORVASC) 5 mg, oral, Daily    busPIRone (BUSPAR) 10 mg, oral, 3 times daily PRN    cholecalciferol (VITAMIN D-3) 25 mcg, Every 24 hours    cyanocobalamin, vitamin B-12, (VITAMIN B-12 ORAL) 1 tablet, Daily    docusate sodium (COLACE) 100 mg, 2 times daily PRN    estradiol (Estrace) 0.01 % (0.1 mg/gram) vaginal cream Place a dime sized amount vaginally nightly for 2 weeks then 3 times a week thereafter.    fluticasone (Flonase) 50 mcg/actuation nasal spray 1 spray    gabapentin (NEURONTIN) 100 mg, oral, 3 times daily    magnesium 250 mg tablet 1 tablet, Daily    methocarbamol (ROBAXIN) 500 mg, oral, 2 times daily PRN    metoprolol succinate XL (TOPROL-XL) 50 mg, oral, Daily    traZODone (DESYREL) 75 mg, oral, Nightly PRN    vibegron 75 mg, oral, Daily      Allergies: No Known Allergies    Past Medical & Surgical History:  Past Medical History:   Diagnosis Date    Abnormal findings on diagnostic imaging of heart and coronary circulation 09/25/2014    Agatston coronary artery calcium score less than 100    Anxiety     Arthritis of right knee 05/02/2023    Difficulty walking 05/02/2023    Diverticulosis of intestine, part unspecified, without perforation or abscess without bleeding 10/25/2015    Diverticulosis    Encounter for screening for malignant neoplasm of colon 11/01/2016    Screening for colon cancer    Essential (primary) hypertension 11/24/2013    Benign essential hypertension    Foot injury 05/02/2023    Frequent urination 05/02/2023    Lactose intolerance, unspecified 10/23/2015    Lactose intolerance    Osteoarthritis of right knee 05/02/2023    Other conditions influencing health status 09/25/2014    Normal echocardiogram    Personal history of other diseases of the nervous system and sense organs 04/27/2016    History of labyrinthitis    Personal  history of other diseases of the respiratory system 10/25/2015    History of asthma    Personal history of other endocrine, nutritional and metabolic disease 2018    History of dehydration    Personal history of other medical treatment 2016    History of screening mammography    Personal history of other medical treatment 2014    H/O bone density study    Personal history of other specified conditions 2016    History of vertigo    Right knee pain 2023    Stiffness of left knee, not elsewhere classified 01/15/2020    Stiffness of left knee    Unspecified fall, initial encounter 2014    Fall, accidental    Uterine prolapse 2023    UTI (urinary tract infection) 2023    Weakness of both hips 2023      Past Surgical History:   Procedure Laterality Date    COLONOSCOPY  2014    Complete Colonoscopy    DILATION AND CURETTAGE OF UTERUS  2014    Dilation And Curettage    OTHER SURGICAL HISTORY  01/15/2020    Knee replacement    TOTAL KNEE ARTHROPLASTY Right        Family History   Problem Relation Name Age of Onset    Breast cancer Mother          Onset age 50s,  age 67    Coronary artery disease Father      Other (Diabetes mellitus) Father      Sudden death Son      No Known Problems Son       Social History     Socioeconomic History    Marital status:      Spouse name: Not on file    Number of children: Not on file    Years of education: Not on file    Highest education level: Not on file   Occupational History    Not on file   Tobacco Use    Smoking status: Never     Passive exposure: Never    Smokeless tobacco: Never   Vaping Use    Vaping status: Never Used   Substance and Sexual Activity    Alcohol use: Yes     Alcohol/week: 7.0 standard drinks of alcohol     Types: 7 Glasses of wine per week    Drug use: Never    Sexual activity: Defer   Other Topics Concern    Not on file   Social History Narrative    Not on file     Social Drivers of Health  "    Financial Resource Strain: Not on file   Food Insecurity: Not on file   Transportation Needs: Not on file   Physical Activity: Not on file   Stress: Not on file   Social Connections: Not on file   Intimate Partner Violence: Not At Risk (8/18/2023)    Received from [facility] Safety     Threatened: Not on file     Insulted: Not on file     Physically Hurt : Not on file     Scream: Not on file   Housing Stability: At Risk (8/18/2023)    Received from [facility] Housing     Living Situation: Not on file     Housing Problems: Not on file       Problems, Past medical history, past surgical history, Medications, allergies, social and family history reviewed and as per the electronic medical record from today's encounter    Review of Systems:  CONST: No fever, chills, fatigue, weight changes  EYES: No loss of vision  ENT: No hearing loss, tinnitus  CV: No chest pain, palpitations  RESP: No dyspnea, shortness of breath, cough  GI: No stool incontinence, nausea, vomiting  : No urinary incontinence  MSK: No joint swelling  SKIN: No rash, no hives  NEURO: No headache, dizziness  PSYCH: Reports anxiety  HEM/LYMPH: No easy bruising or bleeding  All other systems reviewed are negative     Physical Exam:  Vitals: /82   Pulse 72   Resp 18   Ht 1.676 m (5' 6\")   Wt 74.4 kg (164 lb)   SpO2 97%   BMI 26.47 kg/m²   General: No apparent distress. Alert, appropriate, oriented x 3. Mood and affect normal. Speaking in full sentences.  HENT: Normocephalic, atraumatic. Hearing intact.  Eyes: Extraocular movements grossly intact. Pupils equal and round.   Neck: Supple, trachea midline.  Lungs: Symmetric respiratory excursion on visual exam, nonlabored breathing.  Extremities: No clubbing, cyanosis, or edema noted in arms or legs.  Skin: No rashes, lesions, alopecia noted on back or extremities.   Neuro: Alert and appropriate. Able to rise unassisted. Gait within normal limits. Bulk and tone within normal " limits.    Laboratory Data:  The following laboratory data were reviewed during this visit:   Lab Results   Component Value Date    WBC 7.9 05/28/2025    RBC 3.89 05/28/2025    HGB 12.7 05/28/2025    HCT 39.4 05/28/2025     05/28/2025      Lab Results   Component Value Date    INR 1.0 07/10/2018     Lab Results   Component Value Date    CREATININE 0.60 05/28/2025    HGBA1C 6.0 (H) 05/28/2025       Imaging:  The following imaging impressions were reviewed by me during this visit:    -No results found for this or any previous visit from the past 180 days.       I also personally reviewed the images from the above studies myself. These images and my interpretation of them contributed to the management and decision making of the patient's medical plan.    ASSESSMENT:  Ms. Alecia Baptiste is a 84 y.o. female with left buttock and right mid back pain that is consistent with:    1. Fibromyalgia          PLAN:    Diagnostics:   - No further diagnostics are indicated at this time.    Physical Therapy and Rehabilitation:     - Today we further discussed treatments for fibromyalgia, including low impact aerobic activity, robby chi, and strength training. I discussed possible referral to PT to assist with this, which she will think about    Psychologically:  - No needs at this time    Medications  - Recommend trial of Gabapentin 100 mg TID. Education on this medication provided today. Side effects reviewed.     Duration  - Multiple years    Interventions:  - I suspect her pain is primarily related to muscle strain and myofascial pain. This has not improved with 1 month of conservative treatment including NSAID's and activity modification. She trigger point injections to the left gluteal region with complete resolution of this pain. Her mid back/scapular pain did not improve with these injections.           Sincerely,  James De La Rosa MD  [facility] [place]

## 2025-07-14 DIAGNOSIS — I10 HYPERTENSION, ISOLATED SYSTOLIC: ICD-10-CM

## 2025-07-14 RX ORDER — AMLODIPINE BESYLATE 5 MG/1
5 TABLET ORAL DAILY
Qty: 90 TABLET | Refills: 1 | Status: SHIPPED | OUTPATIENT
Start: 2025-07-14

## 2025-07-30 NOTE — PROGRESS NOTES
INITIAL EVALUATION       HISTORY OF PRESENT ILLNESS:   Alecia Baptiste is a 84 y.o. female whom presents to me today for evaluation of urinary incontinence, referred to me by [facility].     PMH significant for osteoporosis, fibromyalgia, hearing loss, bladder prolapse, HTN on amlodipine and metoprolol, HLD, and anxiety on buspar and cympalta.     Has OAB and was on gemtesa for 6 weeks and stopped in January due to cost. She did find that it helped.     At home she gets up twice nightly to urinate and urinates every couple hours. Sometimes finds that she doesn't fully empty.     She drinks three full 8oz glasses per day, her last being a couple hours before bed. A glass of wine daily. Two 2/3 cups of coffee in the morning.     She doesn't experience any burning or pain when she urinates and does not have any vaginal pain at baseline. She states that she does feel a bulge in her vagina but that it does not bother her. She uses her vaginal estrogen infrequently but does not have any dryness or pain that she notices.     PAST MEDICAL HISTORY:  Medical History[1]    PAST SURGICAL HISTORY:  Surgical History[2]     ALLERGIES:   Allergies[3]     MEDICATIONS:   Medication Documentation Review Audit       Reviewed by James De La Rosa MD (Physician) on 07/08/25 at 2357      Medication Order Taking? Sig Documenting Provider Last Dose Status   amLODIPine (Norvasc) 5 mg tablet [id number] Yes Take 1 tablet (5 mg) by mouth once daily. Dipti Mitchell MD  Active   busPIRone (Buspar) 10 mg tablet [id number] Yes Take 1 tablet (10 mg) by mouth 3 times a day as needed (anxiety). Dipti Mitchell MD  Active   cholecalciferol (Vitamin D-3) 25 MCG (1000 UT) tablet [id number] Yes Take 1 tablet (25 mcg) by mouth once every 24 hours. Historical Provider, MD  Active   cyanocobalamin, vitamin B-12, (VITAMIN B-12 ORAL) [id number] Yes Take 1 tablet by mouth once daily. Historical Provider, MD  Active   docusate sodium (Colace) 100 mg capsule [id number]  Yes Take 1 capsule (100 mg) by mouth 2 times a day as needed. Historical Provider, MD  Active    Patient not taking:   Discontinued 07/08/25 1449   estradiol (Estrace) 0.01 % (0.1 mg/gram) vaginal cream [id number] Yes Place a dime sized amount vaginally nightly for 2 weeks then 3 times a week thereafter. Hadley Driver MD  Active   fluticasone (Flonase) 50 mcg/actuation nasal spray [id number] Yes 1 spray by Does not apply route. Historical Provider, MD  Active   gabapentin (Neurontin) 100 mg capsule [id number]  Take 1 capsule (100 mg) by mouth 3 times a day. James De La Rosa MD  Active   magnesium 250 mg tablet [id number] Yes Take 1 tablet (250 mg) by mouth once daily. Historical Provider, MD  Active   methocarbamol (Robaxin) 500 mg tablet [id number] Yes Take 1 tablet (500 mg) by mouth 2 times a day as needed for muscle spasms. James De La Rosa MD  Active   metoprolol succinate XL (Toprol-XL) 50 mg 24 hr tablet [id number] Yes Take 1 tablet (50 mg) by mouth once daily. Dipti Mitchell MD  Active   traZODone (Desyrel) 50 mg tablet [id number] Yes Take 1.5 tablets (75 mg) by mouth as needed at bedtime for sleep. Jeannine Quinones PA-C  Active   vibegron 75 mg tablet [id number]  Take 1 tablet (75 mg) by mouth once daily.   Patient not taking: Reported on 7/8/2025    Hadley Driver MD  Flag for Review                     SOCIAL HISTORY:  Patient  reports that she has never smoked. She has never been exposed to tobacco smoke. She has never used smokeless tobacco. She reports current alcohol use of about 7.0 standard drinks of alcohol per week. She reports that she does not use drugs.   Social History     Socioeconomic History    Marital status:      Spouse name: Not on file    Number of children: Not on file    Years of education: Not on file    Highest education level: Not on file   Occupational History    Not on file   Tobacco Use    Smoking status: Never     Passive exposure: Never    Smokeless tobacco: Never  "  Vaping Use    Vaping status: Never Used   Substance and Sexual Activity    Alcohol use: Yes     Alcohol/week: 7.0 standard drinks of alcohol     Types: 7 Glasses of wine per week    Drug use: Never    Sexual activity: Defer   Other Topics Concern    Not on file   Social History Narrative    Not on file     Social Drivers of Health     Financial Resource Strain: Not on file   Food Insecurity: Not on file   Transportation Needs: Not on file   Physical Activity: Not on file   Stress: Not on file   Social Connections: Not on file   Intimate Partner Violence: Not At Risk (8/18/2023)    Received from [facility] Safety     Threatened: Not on file     Insulted: Not on file     Physically Hurt : Not on file     Scream: Not on file   Housing Stability: At Risk (8/18/2023)    Received from [facility] Housing     Living Situation: Not on file     Housing Problems: Not on file       FAMILY HISTORY:  Family History[4]    REVIEW OF SYSTEMS:  Constitutional: Negative for fever and chills. Denies anorexia, weight loss.  Eyes: Negative for visual disturbance.   Respiratory: Negative for shortness of breath.    Cardiovascular: Negative for chest pain.   Gastrointestinal: Negative for nausea and vomiting.   Genitourinary: See interval history above.  Skin: Negative for rash.   Neurological: Negative for dizziness and numbness.   Psychiatric/Behavioral: Negative for confusion and decreased concentration.     PHYSICAL EXAM:  Temperature 36.2 °C (97.1 °F), temperature source Temporal, height 1.651 m (5' 5\"), weight 74.4 kg (164 lb).  Constitutional: Patient appears well-developed and well-nourished. No distress.   Head: Normocephalic and atraumatic.    Neck: Normal range of motion.    Cardiovascular: Normal rate.    Pulmonary/Chest: Effort normal. No respiratory distress.   Musculoskeletal: Normal range of motion.    Neurological: Alert and oriented to person, place, and time.  Psychiatric: Normal mood and affect. " Behavior is normal. Thought content normal.       Urine dipstick shows negative for all components.     Assessment:      1. Overactive bladder  POCT UA (nonautomated) manually resulted      2. Urge incontinence  vibegron 75 mg tablet          Alecia Baptiste is a 84 y.o. female with UI and Stage II Prolapse     Plan:   Urgency Incontinence  - previously well controlled with gemtesa, will refer to pharmacy for savings program due to issue with cost previously  - discussed increasing water intake by 8 oz per day to remain well hydrated and to not increase coffee intake  - discussed other options including trospium, but due to improvement with gemtesa previously, will try first  - will follow up 3-6 months     Lourdes Cheung, PGY5  Female Reconstruction and Sexual Health Fellow  Departments of OBGYN & Urology  Seen and discussed with Dr. Lemus      Agree with above  Melonie Lemus MD, MPH          [1]   Past Medical History:  Diagnosis Date    Abnormal findings on diagnostic imaging of heart and coronary circulation 09/25/2014    Agatston coronary artery calcium score less than 100    Anxiety     Arthritis of right knee 05/02/2023    Difficulty walking 05/02/2023    Diverticulosis of intestine, part unspecified, without perforation or abscess without bleeding 10/25/2015    Diverticulosis    Encounter for screening for malignant neoplasm of colon 11/01/2016    Screening for colon cancer    Essential (primary) hypertension 11/24/2013    Benign essential hypertension    Foot injury 05/02/2023    Frequent urination 05/02/2023    Lactose intolerance, unspecified 10/23/2015    Lactose intolerance    Osteoarthritis of right knee 05/02/2023    Other conditions influencing health status 09/25/2014    Normal echocardiogram    Personal history of other diseases of the nervous system and sense organs 04/27/2016    History of labyrinthitis    Personal history of other diseases of the respiratory system 10/25/2015    History of asthma     Personal history of other endocrine, nutritional and metabolic disease 2018    History of dehydration    Personal history of other medical treatment 2016    History of screening mammography    Personal history of other medical treatment 2014    H/O bone density study    Personal history of other specified conditions 2016    History of vertigo    Right knee pain 2023    Stiffness of left knee, not elsewhere classified 01/15/2020    Stiffness of left knee    Unspecified fall, initial encounter 2014    Fall, accidental    Uterine prolapse 2023    UTI (urinary tract infection) 2023    Weakness of both hips 2023   [2]   Past Surgical History:  Procedure Laterality Date    COLONOSCOPY  2014    Complete Colonoscopy    DILATION AND CURETTAGE OF UTERUS  2014    Dilation And Curettage    OTHER SURGICAL HISTORY  01/15/2020    Knee replacement    TOTAL KNEE ARTHROPLASTY Right    [3] No Known Allergies  [4]   Family History  Problem Relation Name Age of Onset    Breast cancer Mother          Onset age 50s,  age 67    Coronary artery disease Father      Other (Diabetes mellitus) Father      Sudden death Son      No Known Problems Son

## 2025-07-31 ENCOUNTER — APPOINTMENT (OUTPATIENT)
Dept: UROLOGY | Facility: CLINIC | Age: 85
End: 2025-07-31
Payer: MEDICARE

## 2025-07-31 VITALS — TEMPERATURE: 97.1 F | HEIGHT: 65 IN | WEIGHT: 164 LBS | BODY MASS INDEX: 27.32 KG/M2

## 2025-07-31 DIAGNOSIS — N39.41 URGE INCONTINENCE: ICD-10-CM

## 2025-07-31 DIAGNOSIS — N32.81 OVERACTIVE BLADDER: Primary | ICD-10-CM

## 2025-07-31 LAB
POC APPEARANCE, URINE: CLEAR
POC BILIRUBIN, URINE: NEGATIVE
POC BLOOD, URINE: NEGATIVE
POC COLOR, URINE: YELLOW
POC GLUCOSE, URINE: NEGATIVE MG/DL
POC KETONES, URINE: NEGATIVE MG/DL
POC LEUKOCYTES, URINE: NEGATIVE
POC NITRITE,URINE: NEGATIVE
POC PH, URINE: 7.5 PH
POC PROTEIN, URINE: NEGATIVE MG/DL
POC SPECIFIC GRAVITY, URINE: 1.02
POC UROBILINOGEN, URINE: 0.2 EU/DL

## 2025-07-31 PROCEDURE — 99204 OFFICE O/P NEW MOD 45 MIN: CPT | Performed by: OBSTETRICS & GYNECOLOGY

## 2025-07-31 PROCEDURE — 1159F MED LIST DOCD IN RCRD: CPT | Performed by: OBSTETRICS & GYNECOLOGY

## 2025-07-31 PROCEDURE — 1126F AMNT PAIN NOTED NONE PRSNT: CPT | Performed by: OBSTETRICS & GYNECOLOGY

## 2025-07-31 PROCEDURE — G2211 COMPLEX E/M VISIT ADD ON: HCPCS | Performed by: OBSTETRICS & GYNECOLOGY

## 2025-07-31 PROCEDURE — 81002 URINALYSIS NONAUTO W/O SCOPE: CPT | Performed by: OBSTETRICS & GYNECOLOGY

## 2025-07-31 PROCEDURE — 1036F TOBACCO NON-USER: CPT | Performed by: OBSTETRICS & GYNECOLOGY

## 2025-07-31 RX ORDER — VIBEGRON 75 MG/1
1 TABLET, FILM COATED ORAL DAILY
Qty: 28 TABLET | Refills: 0 | Status: SHIPPED | OUTPATIENT
Start: 2025-07-31

## 2025-07-31 RX ORDER — VIBEGRON 75 MG/1
1 TABLET, FILM COATED ORAL DAILY
Qty: 28 TABLET | Refills: 0 | Status: CANCELLED | COMMUNITY
Start: 2025-07-31

## 2025-07-31 ASSESSMENT — PAIN SCALES - GENERAL: PAINLEVEL_OUTOF10: 0-NO PAIN

## 2025-08-09 DIAGNOSIS — G47.00 INSOMNIA, UNSPECIFIED TYPE: ICD-10-CM

## 2025-08-12 RX ORDER — TRAZODONE HYDROCHLORIDE 50 MG/1
TABLET ORAL
Qty: 45 TABLET | Refills: 1 | Status: SHIPPED | OUTPATIENT
Start: 2025-08-12

## 2025-08-15 ENCOUNTER — TELEPHONE (OUTPATIENT)
Dept: PAIN MEDICINE | Facility: CLINIC | Age: 85
End: 2025-08-15
Payer: MEDICARE

## 2025-08-18 DIAGNOSIS — F41.9 ANXIETY: ICD-10-CM

## 2025-08-18 RX ORDER — BUSPIRONE HYDROCHLORIDE 10 MG/1
10 TABLET ORAL 3 TIMES DAILY PRN
Qty: 90 TABLET | Refills: 0 | Status: SHIPPED | OUTPATIENT
Start: 2025-08-18

## 2025-08-20 ENCOUNTER — TELEPHONE (OUTPATIENT)
Dept: PRIMARY CARE | Facility: CLINIC | Age: 85
End: 2025-08-20
Payer: MEDICARE

## 2025-08-26 ENCOUNTER — APPOINTMENT (OUTPATIENT)
Dept: PHARMACY | Facility: HOSPITAL | Age: 85
End: 2025-08-26
Payer: MEDICARE

## 2025-08-27 ENCOUNTER — PREP FOR PROCEDURE (OUTPATIENT)
Dept: PAIN MEDICINE | Facility: CLINIC | Age: 85
End: 2025-08-27
Payer: MEDICARE

## 2025-08-27 ENCOUNTER — TELEPHONE (OUTPATIENT)
Facility: CLINIC | Age: 85
End: 2025-08-27
Payer: MEDICARE

## 2025-08-27 DIAGNOSIS — M79.18 MYOFASCIAL PAIN: Primary | ICD-10-CM

## 2025-09-02 DIAGNOSIS — I10 BENIGN ESSENTIAL HTN: ICD-10-CM

## 2025-09-02 RX ORDER — METOPROLOL SUCCINATE 50 MG/1
50 TABLET, EXTENDED RELEASE ORAL DAILY
Qty: 90 TABLET | Refills: 1 | Status: SHIPPED | OUTPATIENT
Start: 2025-09-02